# Patient Record
Sex: MALE | Race: BLACK OR AFRICAN AMERICAN | Employment: UNEMPLOYED | URBAN - METROPOLITAN AREA
[De-identification: names, ages, dates, MRNs, and addresses within clinical notes are randomized per-mention and may not be internally consistent; named-entity substitution may affect disease eponyms.]

---

## 2024-08-06 ENCOUNTER — HOSPITAL ENCOUNTER (EMERGENCY)
Facility: HOSPITAL | Age: 34
Discharge: HOME | End: 2024-08-06
Attending: EMERGENCY MEDICINE

## 2024-08-06 VITALS
OXYGEN SATURATION: 99 % | BODY MASS INDEX: 20.4 KG/M2 | HEIGHT: 67 IN | HEART RATE: 72 BPM | RESPIRATION RATE: 16 BRPM | DIASTOLIC BLOOD PRESSURE: 76 MMHG | WEIGHT: 130 LBS | SYSTOLIC BLOOD PRESSURE: 119 MMHG | TEMPERATURE: 99.1 F

## 2024-08-06 DIAGNOSIS — E87.6 HYPOKALEMIA: Primary | ICD-10-CM

## 2024-08-06 LAB
ANION GAP SERPL CALC-SCNC: 5 MMOL/L (ref 5–15)
BASOPHILS # BLD: 0 K/UL (ref 0–0.1)
BASOPHILS NFR BLD: 1 % (ref 0–1)
BUN SERPL-MCNC: 16 MG/DL (ref 6–20)
BUN/CREAT SERPL: 13 (ref 12–20)
CA-I BLD-MCNC: 8.9 MG/DL (ref 8.5–10.1)
CHLORIDE SERPL-SCNC: 109 MMOL/L (ref 97–108)
CO2 SERPL-SCNC: 26 MMOL/L (ref 21–32)
CREAT SERPL-MCNC: 1.23 MG/DL (ref 0.7–1.3)
DIFFERENTIAL METHOD BLD: ABNORMAL
EOSINOPHIL # BLD: 0 K/UL (ref 0–0.4)
EOSINOPHIL NFR BLD: 1 % (ref 0–7)
ERYTHROCYTE [DISTWIDTH] IN BLOOD BY AUTOMATED COUNT: 13.1 % (ref 11.5–14.5)
ETHANOL SERPL-MCNC: <10 MG/DL (ref 0–0.08)
GLUCOSE SERPL-MCNC: 109 MG/DL (ref 65–100)
HCT VFR BLD AUTO: 33.2 % (ref 36.6–50.3)
HGB BLD-MCNC: 11.4 G/DL (ref 12.1–17)
IMM GRANULOCYTES # BLD AUTO: 0 K/UL (ref 0–0.04)
IMM GRANULOCYTES NFR BLD AUTO: 1 % (ref 0–0.5)
LYMPHOCYTES # BLD: 1.4 K/UL (ref 0.8–3.5)
LYMPHOCYTES NFR BLD: 32 % (ref 12–49)
MCH RBC QN AUTO: 31.3 PG (ref 26–34)
MCHC RBC AUTO-ENTMCNC: 34.3 G/DL (ref 30–36.5)
MCV RBC AUTO: 91.2 FL (ref 80–99)
MONOCYTES # BLD: 0.4 K/UL (ref 0–1)
MONOCYTES NFR BLD: 9 % (ref 5–13)
NEUTS SEG # BLD: 2.5 K/UL (ref 1.8–8)
NEUTS SEG NFR BLD: 56 % (ref 32–75)
NRBC # BLD: 0 K/UL (ref 0–0.01)
NRBC BLD-RTO: 0 PER 100 WBC
PLATELET # BLD AUTO: 303 K/UL (ref 150–400)
PMV BLD AUTO: 9.3 FL (ref 8.9–12.9)
POTASSIUM SERPL-SCNC: 3.1 MMOL/L (ref 3.5–5.1)
RBC # BLD AUTO: 3.64 M/UL (ref 4.1–5.7)
SODIUM SERPL-SCNC: 140 MMOL/L (ref 136–145)
WBC # BLD AUTO: 4.4 K/UL (ref 4.1–11.1)

## 2024-08-06 PROCEDURE — 85025 COMPLETE CBC W/AUTO DIFF WBC: CPT

## 2024-08-06 PROCEDURE — 36415 COLL VENOUS BLD VENIPUNCTURE: CPT

## 2024-08-06 PROCEDURE — 80048 BASIC METABOLIC PNL TOTAL CA: CPT

## 2024-08-06 PROCEDURE — 99283 EMERGENCY DEPT VISIT LOW MDM: CPT

## 2024-08-06 PROCEDURE — 82077 ASSAY SPEC XCP UR&BREATH IA: CPT

## 2024-08-06 RX ORDER — POTASSIUM CHLORIDE 20 MEQ/1
20 TABLET, EXTENDED RELEASE ORAL DAILY
Qty: 20 TABLET | Refills: 0 | Status: SHIPPED | OUTPATIENT
Start: 2024-08-06

## 2024-08-06 ASSESSMENT — PAIN - FUNCTIONAL ASSESSMENT: PAIN_FUNCTIONAL_ASSESSMENT: NONE - DENIES PAIN

## 2024-08-06 NOTE — ED PROVIDER NOTES
affecting Diagnosis/Treatment: Patient is homeless/has housing insecurity.  Given Housing Resources.    Smoking Cessation: Not Applicable    PROCEDURES   Unless otherwise noted above, none  Procedures      CRITICAL CARE TIME   Patient does not meet Critical Care Time, 0 minutes    ED IMPRESSION     1. Hypokalemia          DISPOSITION/PLAN   DISPOSITION Decision To Discharge 08/06/2024 05:20:58 PM    Discharge Note: The patient is stable for discharge home. The signs, symptoms, diagnosis, and discharge instructions have been discussed, understanding conveyed, and agreed upon. The patient is to follow up as recommended or return to ER should their symptoms worsen.      PATIENT REFERRED TO:  Elida Valenzuela MD  [address] [place], [place] [id number] [phone]    Schedule an appointment as soon as possible for a visit           DISCHARGE MEDICATIONS:     Medication List        START taking these medications      potassium chloride 20 MEQ extended release tablet  Commonly known as: KLOR-CON M  Take 1 tablet by mouth daily               Where to Get Your Medications        Information about where to get these medications is not yet available    Ask your nurse or doctor about these medications  potassium chloride 20 MEQ extended release tablet           DISCONTINUED MEDICATIONS:  Current Discharge Medication List          I am the Primary Clinician of Record. Amari Cook MD (electronically signed)    (Please note that parts of this dictation were completed with voice recognition software. Quite often unanticipated grammatical, syntax, homophones, and other interpretive errors are inadvertently transcribed by the computer software. Please disregards these errors. Please excuse any errors that have escaped final proofreading.)     Amari Cook MD  08/06/24 9542

## 2024-08-06 NOTE — ED TRIAGE NOTES
Pt was dropped off by PD after walking around at [employer] looking for euros, A&Ox4, pt hard to understand due to accent but he may have flight of ideas, pt reports just wanting to get check as the lady told him

## 2024-08-06 NOTE — DISCHARGE INSTRUCTIONS
Thank you for choosing our Emergency Department for your care.  It is our privilege to care for you in your time of need.  In the next several days, you may receive a survey via email or mailed to your home about your experience with our team.  We would greatly appreciate you taking a few minutes to complete the survey, as we use this information to learn what we have done well and what we could be doing better. Thank you for trusting us with your care!    Below you will find a list of your tests from today's visit.   Labs  Recent Results (from the past 12 hour(s))   BMP    Collection Time: 08/06/24  4:08 PM   Result Value Ref Range    Sodium 140 136 - 145 mmol/L    Potassium 3.1 (L) 3.5 - 5.1 mmol/L    Chloride 109 (H) 97 - 108 mmol/L    CO2 26 21 - 32 mmol/L    Anion Gap 5 5 - 15 mmol/L    Glucose 109 (H) 65 - 100 mg/dL    BUN 16 6 - 20 mg/dL    Creatinine 1.23 0.70 - 1.30 mg/dL    BUN/Creatinine Ratio 13 12 - 20      Est, Glom Filt Rate 78 >60 ml/min/1.73m2    Calcium 8.9 8.5 - 10.1 mg/dL   CBC with Auto Differential    Collection Time: 08/06/24  4:08 PM   Result Value Ref Range    WBC 4.4 4.1 - 11.1 K/uL    RBC 3.64 (L) 4.10 - 5.70 M/uL    Hemoglobin 11.4 (L) 12.1 - 17.0 g/dL    Hematocrit 33.2 (L) 36.6 - 50.3 %    MCV 91.2 80.0 - 99.0 FL    MCH 31.3 26.0 - 34.0 PG    MCHC 34.3 30.0 - 36.5 g/dL    RDW 13.1 11.5 - 14.5 %    Platelets 303 150 - 400 K/uL    MPV 9.3 8.9 - 12.9 FL    Nucleated RBCs 0.0 0.0  WBC    nRBC 0.00 0.00 - 0.01 K/uL    Neutrophils % 56 32 - 75 %    Lymphocytes % 32 12 - 49 %    Monocytes % 9 5 - 13 %    Eosinophils % 1 0 - 7 %    Basophils % 1 0 - 1 %    Immature Granulocytes % 1 (H) 0 - 0.5 %    Neutrophils Absolute 2.5 1.8 - 8.0 K/UL    Lymphocytes Absolute 1.4 0.8 - 3.5 K/UL    Monocytes Absolute 0.4 0.0 - 1.0 K/UL    Eosinophils Absolute 0.0 0.0 - 0.4 K/UL    Basophils Absolute 0.0 0.0 - 0.1 K/UL    Immature Granulocytes Absolute 0.0 0.00 - 0.04 K/UL    Differential Type

## 2024-08-06 NOTE — BSMART NOTE
Comprehensive Assessment Form Part 1      Section I - Disposition      No past medical history on file.       The Medical Doctor to Psychiatrist conference was not completed.  The Medical Doctor is in agreement with disposition of discharge with resources pending medical clearance.    The plan is discharge with resources.  The on-call Psychiatrist consulted was Dr. Cancino who agreed with resources with community support.  The admitting Psychiatrist will be Dr. MENDEZ/LALA.  The admitting Diagnosis is N/A.  The Payor source is None.      This writer reviewed the [employer] Suicide Severity Rating Scale in nursing flowsheet and the risk level assigned is NO risk.  Based on this assessment, the risk of suicide is NO risk and the plan is discharge with resources as patient does not meet criteria and does not want to be admitted at this time.    Section II - Integrated Summary  Summary:      Per Triage: \"Pt was dropped off by PD after walking around at [facility] looking for euros, A&Ox4, pt hard to understand due to accent but he may have flight of ideas, pt reports just wanting to get check as the lady told him\".    [employer] met with patient at bedside in ED room #24. Per triage report patient was brought in by police department after walking around at [facility] looking for euros and was brought in to get checked out after observing flight of ideas.  [employer] introduced self/role and received consent for assessment.      Patient is Doe Thomas, 34 year old male that was brought in by PD for evaluation as a result of being found looking for euros and observed to have a flight of ideas.  Writer observed patient to be calm and cooperative throughout assessment as evidenced by answering questions from writer.  Patient was observed to have a flight of ideas and stated \"I'm the One[place] of [place]\" \"I'm the [place] Oneill\" and expressed concerned for family in [place].  Patient was observed to be emotional when talking about politics and the things he has

## 2024-08-09 ENCOUNTER — HOSPITAL ENCOUNTER (EMERGENCY)
Facility: HOSPITAL | Age: 34
Discharge: HOME | End: 2024-08-09
Attending: STUDENT IN AN ORGANIZED HEALTH CARE EDUCATION/TRAINING PROGRAM

## 2024-08-09 VITALS
SYSTOLIC BLOOD PRESSURE: 127 MMHG | RESPIRATION RATE: 19 BRPM | HEART RATE: 80 BPM | BODY MASS INDEX: 20.4 KG/M2 | WEIGHT: 130 LBS | DIASTOLIC BLOOD PRESSURE: 64 MMHG | OXYGEN SATURATION: 99 % | TEMPERATURE: 98.6 F | HEIGHT: 67 IN

## 2024-08-09 DIAGNOSIS — S39.012A STRAIN OF LUMBAR REGION, INITIAL ENCOUNTER: ICD-10-CM

## 2024-08-09 DIAGNOSIS — Z59.00 HOMELESSNESS: Primary | ICD-10-CM

## 2024-08-09 PROCEDURE — 6370000000 HC RX 637 (ALT 250 FOR IP): Performed by: STUDENT IN AN ORGANIZED HEALTH CARE EDUCATION/TRAINING PROGRAM

## 2024-08-09 PROCEDURE — 99283 EMERGENCY DEPT VISIT LOW MDM: CPT

## 2024-08-09 RX ORDER — IBUPROFEN 200 MG
400 TABLET ORAL
Status: COMPLETED | OUTPATIENT
Start: 2024-08-09 | End: 2024-08-09

## 2024-08-09 RX ADMIN — IBUPROFEN 400 MG: 200 TABLET, FILM COATED ORAL at 17:36

## 2024-08-09 SDOH — ECONOMIC STABILITY - HOUSING INSECURITY: HOMELESSNESS UNSPECIFIED: Z59.00

## 2024-08-09 ASSESSMENT — PAIN - FUNCTIONAL ASSESSMENT: PAIN_FUNCTIONAL_ASSESSMENT: 0-10

## 2024-08-09 ASSESSMENT — LIFESTYLE VARIABLES
HOW OFTEN DO YOU HAVE A DRINK CONTAINING ALCOHOL: NEVER
HOW MANY STANDARD DRINKS CONTAINING ALCOHOL DO YOU HAVE ON A TYPICAL DAY: PATIENT DOES NOT DRINK

## 2024-08-09 ASSESSMENT — PAIN DESCRIPTION - LOCATION: LOCATION: BACK

## 2024-08-09 ASSESSMENT — PAIN SCALES - GENERAL: PAINLEVEL_OUTOF10: 3

## 2024-08-09 NOTE — ED TRIAGE NOTES
Pt endorses homelessness after \"his nicholas told him he had to go\". Pt denies SI/HI. Pt does endorse back pain from walking.

## 2024-08-09 NOTE — ED NOTES
Patient discharged by Yaya Castillo pt sent to the front lobby, with strong and steady gait, no acute distress noted at time of discharge - Discharge information / home RX / and reasons to return to the ED were reviewed by the ED provider.

## 2024-08-09 NOTE — ED PROVIDER NOTES
[facility] EMERGENCY DEPT  EMERGENCY DEPARTMENT HISTORY AND PHYSICAL EXAM      Date: 8/9/2024  Patient Name: Doe Thomas  MRN: [id number]  Birthdate 1990  Date of evaluation: 8/9/2024  Provider: Daryl Javier MD   Note Started: 4:49 PM EDT 8/9/24    HISTORY OF PRESENT ILLNESS     Chief Complaint   Patient presents with    Back Pain    Homeless       History Provided By: Patient    HPI: Doe Thomas is a 34 y.o. male with no significant past medical history presents for evaluation of homelessness and back pain.  Patient states he has been homeless and thus walking around a lot and sitting on hard surfaces.  This is led to generalized back pain, worse with certain positions.  No motor weakness or loss of sensation.  No recent trauma.  No urinary incontinence or retention    PAST MEDICAL HISTORY   Past Medical History:  No past medical history on file.    Past Surgical History:  No past surgical history on file.    Family History:  No family history on file.    Social History:       Allergies:  No Known Allergies    PCP: No primary care provider on file.    Current Meds:   Current Facility-Administered Medications   Medication Dose Route Frequency Provider Last Rate Last Admin    ibuprofen (ADVIL;MOTRIN) tablet 400 mg  400 mg Oral NOW Daryl Javier MD         Current Outpatient Medications   Medication Sig Dispense Refill    potassium chloride (KLOR-CON M) 20 MEQ extended release tablet Take 1 tablet by mouth daily 20 tablet 0       Social Determinants of Health:   Social Determinants of Health     Tobacco Use: Not on file   Alcohol Use: Not At Risk (8/9/2024)    AUDIT-C     Frequency of Alcohol Consumption: Never     Average Number of Drinks: Patient does not drink     Frequency of Binge Drinking: Never   Financial Resource Strain: Not on file   Food Insecurity: Not on file   Transportation Needs: Not on file   Physical Activity: Not on file   Stress: Not on file   Social Connections: Not on file

## 2024-08-09 NOTE — DISCHARGE INSTRUCTIONS
______________________________________________________________________  [facility] ([employer]    For women and children  [address] [place], [place]  [phone]  Opens at 10am    The continuum of supportive services in housing plans may include, but are not limited to:  Referrals to job finding assistance agencies  Budgeting and money management workshops  Mental health and substance abuse screening and counseling  Domestic violence and sexual abuse counseling  Parenting and nutrition instruction  ______________________________________________________________________  [facility]    Now open to residents 18 years and older.  Location: [address] [place] [address] in [place]  Will accept guests seeking cold refuge starting at 8pm throughout the winter months.   Guests must be onsite and signed in by 10pm.   Shelter closes every morning at 8am.    For more information contact Jailene Gonzalez at:  [email or web address] or call [phone]  ______________________________________________________________________   [facility]    Open nightly 7pm to 7am from November to April  Located in the Ballroom at [facility]: [address] [place], [place]  Entrance is on the side of the building to the left of the main entrance.   You can arrive as early as 6pm and  line. Check-in is 7pm to 9pm. They offer warm dinner and bagged breakfast.  Contact: [phone]  ______________________________________________________________________            ADDITIONAL RESOURCES:      ______________________________________________________________________  [facility]    Call for assistance with social work  [phone]  ______________________________________________________________________      _______________________________________________________________________  [facility] ([facility]) -

## 2024-08-10 ENCOUNTER — HOSPITAL ENCOUNTER (EMERGENCY)
Facility: HOSPITAL | Age: 34
Discharge: HOME | End: 2024-08-10
Attending: STUDENT IN AN ORGANIZED HEALTH CARE EDUCATION/TRAINING PROGRAM
Payer: MEDICAID

## 2024-08-10 VITALS
HEIGHT: 67 IN | HEART RATE: 63 BPM | DIASTOLIC BLOOD PRESSURE: 61 MMHG | SYSTOLIC BLOOD PRESSURE: 106 MMHG | BODY MASS INDEX: 20.4 KG/M2 | TEMPERATURE: 98.6 F | OXYGEN SATURATION: 100 % | WEIGHT: 130 LBS | RESPIRATION RATE: 18 BRPM

## 2024-08-10 DIAGNOSIS — M79.18 MUSCULOSKELETAL PAIN: Primary | ICD-10-CM

## 2024-08-10 PROCEDURE — 6370000000 HC RX 637 (ALT 250 FOR IP): Performed by: STUDENT IN AN ORGANIZED HEALTH CARE EDUCATION/TRAINING PROGRAM

## 2024-08-10 PROCEDURE — 99283 EMERGENCY DEPT VISIT LOW MDM: CPT

## 2024-08-10 PROCEDURE — 36415 COLL VENOUS BLD VENIPUNCTURE: CPT

## 2024-08-10 PROCEDURE — 93005 ELECTROCARDIOGRAM TRACING: CPT | Performed by: STUDENT IN AN ORGANIZED HEALTH CARE EDUCATION/TRAINING PROGRAM

## 2024-08-10 RX ORDER — ACETAMINOPHEN 500 MG
1000 TABLET ORAL EVERY 6 HOURS PRN
Qty: 40 TABLET | Refills: 0 | Status: ON HOLD | OUTPATIENT
Start: 2024-08-10

## 2024-08-10 RX ORDER — METHOCARBAMOL 1000 MG/1
1000 TABLET, COATED ORAL 4 TIMES DAILY PRN
Qty: 20 TABLET | Refills: 0 | Status: ON HOLD | OUTPATIENT
Start: 2024-08-10 | End: 2024-08-15

## 2024-08-10 RX ORDER — ACETAMINOPHEN 500 MG
1000 TABLET ORAL
Status: COMPLETED | OUTPATIENT
Start: 2024-08-10 | End: 2024-08-10

## 2024-08-10 RX ORDER — METHOCARBAMOL 500 MG/1
750 TABLET, FILM COATED ORAL ONCE
Status: COMPLETED | OUTPATIENT
Start: 2024-08-10 | End: 2024-08-10

## 2024-08-10 RX ADMIN — METHOCARBAMOL 750 MG: 500 TABLET ORAL at 22:14

## 2024-08-10 RX ADMIN — ACETAMINOPHEN 1000 MG: 500 TABLET ORAL at 22:15

## 2024-08-10 ASSESSMENT — PAIN SCALES - GENERAL: PAINLEVEL_OUTOF10: 5

## 2024-08-10 ASSESSMENT — PAIN - FUNCTIONAL ASSESSMENT: PAIN_FUNCTIONAL_ASSESSMENT: 0-10

## 2024-08-11 LAB
EKG ATRIAL RATE: 66 BPM
EKG DIAGNOSIS: NORMAL
EKG P AXIS: 62 DEGREES
EKG P-R INTERVAL: 148 MS
EKG Q-T INTERVAL: 422 MS
EKG QRS DURATION: 94 MS
EKG QTC CALCULATION (BAZETT): 442 MS
EKG R AXIS: 47 DEGREES
EKG T AXIS: 40 DEGREES
EKG VENTRICULAR RATE: 66 BPM

## 2024-08-11 NOTE — ED PROVIDER NOTES
Binge Drinking: Never   Financial Resource Strain: Not on file   Food Insecurity: Not on file   Transportation Needs: Not on file   Physical Activity: Not on file   Stress: Not on file   Social Connections: Not on file   Intimate Partner Violence: Not on file   Depression: Not on file   Housing Stability: Not on file   Interpersonal Safety: Not At Risk (8/10/2024)    Interpersonal Safety Domain Source: IP Abuse Screening     Physical abuse: Denies     Verbal abuse: Denies     Emotional abuse: Denies     Financial abuse: Denies     Sexual abuse: Denies   Utilities: Not on file       PHYSICAL EXAM   Physical Exam  Vitals and nursing note reviewed.   Constitutional:       General: He is not in acute distress.     Appearance: He is not ill-appearing, toxic-appearing or diaphoretic.   HENT:      Head: Normocephalic and atraumatic.   Eyes:      Extraocular Movements: Extraocular movements intact.      Conjunctiva/sclera: Conjunctivae normal.   Cardiovascular:      Rate and Rhythm: Normal rate and regular rhythm.   Pulmonary:      Effort: Pulmonary effort is normal.      Breath sounds: Normal breath sounds.   Abdominal:      Palpations: Abdomen is soft.      Tenderness: There is no abdominal tenderness.   Musculoskeletal:         General: No swelling, tenderness, deformity or signs of injury.   Neurological:      Mental Status: He is alert and oriented to person, place, and time.           SCREENINGS                No data recorded    LAB, EKG AND DIAGNOSTIC RESULTS   Labs:  No results found for this or any previous visit (from the past 12 hour(s)).    EKG:.See ED Course Below    Radiologic Studies:  Non-plain film images such as CT, Ultrasound and MRI are read by the radiologist. Plain radiographic images are visualized and preliminarily interpreted by the ED Provider with the following findings: Not Applicable.    Interpretation per the Radiologist below, if available at the time of this note:  No orders to display

## 2024-08-12 ENCOUNTER — HOSPITAL ENCOUNTER (EMERGENCY)
Facility: HOSPITAL | Age: 34
Discharge: LEFT BEFORE DISCHARGE | DRG: 751 | End: 2024-08-12
Attending: EMERGENCY MEDICINE
Payer: MEDICAID

## 2024-08-12 ENCOUNTER — HOSPITAL ENCOUNTER (EMERGENCY)
Facility: HOSPITAL | Age: 34
Discharge: HOME | End: 2024-08-12

## 2024-08-12 VITALS
WEIGHT: 130 LBS | DIASTOLIC BLOOD PRESSURE: 97 MMHG | HEIGHT: 67 IN | BODY MASS INDEX: 20.4 KG/M2 | TEMPERATURE: 98.6 F | OXYGEN SATURATION: 98 % | HEART RATE: 73 BPM | RESPIRATION RATE: 20 BRPM | SYSTOLIC BLOOD PRESSURE: 128 MMHG

## 2024-08-12 DIAGNOSIS — Z53.21 ELOPED FROM EMERGENCY DEPARTMENT: Primary | ICD-10-CM

## 2024-08-12 DIAGNOSIS — R46.89 DISORGANIZED BEHAVIOR: ICD-10-CM

## 2024-08-12 DIAGNOSIS — Z59.00 HOMELESSNESS: ICD-10-CM

## 2024-08-12 DIAGNOSIS — R21 RASH AND OTHER NONSPECIFIC SKIN ERUPTION: ICD-10-CM

## 2024-08-12 PROCEDURE — 99281 EMR DPT VST MAYX REQ PHY/QHP: CPT

## 2024-08-12 SDOH — ECONOMIC STABILITY - HOUSING INSECURITY: HOMELESSNESS UNSPECIFIED: Z59.00

## 2024-08-12 ASSESSMENT — LIFESTYLE VARIABLES
HOW MANY STANDARD DRINKS CONTAINING ALCOHOL DO YOU HAVE ON A TYPICAL DAY: PATIENT DOES NOT DRINK
HOW OFTEN DO YOU HAVE A DRINK CONTAINING ALCOHOL: NEVER

## 2024-08-12 ASSESSMENT — PAIN SCALES - GENERAL: PAINLEVEL_OUTOF10: 5

## 2024-08-12 ASSESSMENT — PAIN - FUNCTIONAL ASSESSMENT: PAIN_FUNCTIONAL_ASSESSMENT: 0-10

## 2024-08-12 NOTE — ED TRIAGE NOTES
Pt states having rash/break out on skin.     Noted to be on left wrist in triage, pt states also having the rash on his back and buttocks.     Pt is visually hallucinating in triage, seeing things flying around him.

## 2024-08-12 NOTE — ED PROVIDER NOTES
[facility] EMERGENCY DEPT  EMERGENCY DEPARTMENT HISTORY AND PHYSICAL EXAM      Date: 8/12/2024  Patient Name: Doe Thomas      History of Presenting Illness       Chief Complaint   Patient presents with    Rash       History was provided by: Patient    Location/Duration/Severity/Modifying factors     Doe Thomas is a 34 y.o. male who arrived to the emergency department by Ambulatory with complaints of Rash        34-year-old male with unknown medical history presenting to the ED with complaint of a rash, located on the left wrist.  While interviewing the patient about the rash she had frequent pauses where he ignored my questions and at times said inappropriate things such as \"Pee Pee Poo Poo Joe\".  He does respond when I ask about his mental health about hallucinations to which she denies and denies any SI or HI.  He denies any mental health concerns in general, he is currently homeless.  Rash started over the last couple of days although he denies any exposure to any plants, he says he thinks it may be related to \"food poisoning\".  He states he \"wants some liquid\" clarified he want something to drink          There are no other complaints, changes, or physical findings at this time.    PCP: No primary care provider on file.    No current facility-administered medications for this encounter.     Current Outpatient Medications   Medication Sig Dispense Refill    acetaminophen (TYLENOL) 500 MG tablet Take 2 tablets by mouth every 6 hours as needed for Pain 40 tablet 0    Methocarbamol 1000 MG TABS Take 1,000 mg by mouth 4 times daily as needed (Muscle spasms) 20 tablet 0    potassium chloride (KLOR-CON M) 20 MEQ extended release tablet Take 1 tablet by mouth daily 20 tablet 0       Past History     Past Medical History:  History reviewed. No pertinent past medical history.    Past Surgical History:  History reviewed. No pertinent surgical history.    Family History:  History reviewed. No pertinent family history.    Social  nonspecific skin eruption        PATIENT REFERRED TO:  No follow-up provider specified.    DISCHARGE MEDICATIONS:  New Prescriptions    No medications on file       Attestations:    Maximo Polo DO    Please note that this dictation was completed with [employer], the computer voice recognition software.  Quite often unanticipated grammatical, syntax, homophones, and other interpretive errors are inadvertently transcribed by the computer software.  Please disregard these errors.  Please excuse any errors that have escaped final proofreading.  Thank you.         Maximo Polo DO  08/12/24 0401

## 2024-08-12 NOTE — BSMART NOTE
This writer aware of pt, however pt eloped.  Dr Polo states that there is no need to petition for ECO at this time

## 2024-08-13 ENCOUNTER — HOSPITAL ENCOUNTER (INPATIENT)
Facility: HOSPITAL | Age: 34
LOS: 8 days | Discharge: HOME | DRG: 751 | End: 2024-08-21
Attending: PSYCHIATRY & NEUROLOGY | Admitting: PSYCHIATRY & NEUROLOGY
Payer: MEDICAID

## 2024-08-13 DIAGNOSIS — F99 PSYCHIATRIC PROBLEM: Primary | ICD-10-CM

## 2024-08-13 PROBLEM — F29 PSYCHOSIS, UNSPECIFIED PSYCHOSIS TYPE (HCC): Status: ACTIVE | Noted: 2024-08-13

## 2024-08-13 LAB
ALBUMIN SERPL-MCNC: 4 G/DL (ref 3.5–5)
ALBUMIN/GLOB SERPL: 1.1 (ref 1.1–2.2)
ALP SERPL-CCNC: 56 U/L (ref 45–117)
ALT SERPL-CCNC: 34 U/L (ref 12–78)
AMPHET UR QL SCN: NEGATIVE
ANION GAP SERPL CALC-SCNC: 7 MMOL/L (ref 5–15)
APAP SERPL-MCNC: <2 UG/ML (ref 10–30)
AST SERPL W P-5'-P-CCNC: 17 U/L (ref 15–37)
BARBITURATES UR QL SCN: NEGATIVE
BASOPHILS # BLD: 0 K/UL (ref 0–0.1)
BASOPHILS NFR BLD: 0 % (ref 0–1)
BENZODIAZ UR QL: NEGATIVE
BILIRUB SERPL-MCNC: 1 MG/DL (ref 0.2–1)
BUN SERPL-MCNC: 15 MG/DL (ref 6–20)
BUN/CREAT SERPL: 12 (ref 12–20)
CA-I BLD-MCNC: 9.6 MG/DL (ref 8.5–10.1)
CANNABINOIDS UR QL SCN: NEGATIVE
CHLORIDE SERPL-SCNC: 109 MMOL/L (ref 97–108)
CO2 SERPL-SCNC: 23 MMOL/L (ref 21–32)
COCAINE UR QL SCN: NEGATIVE
CREAT SERPL-MCNC: 1.21 MG/DL (ref 0.7–1.3)
DATE LAST DOSE: ABNORMAL
DATE LAST DOSE: ABNORMAL
DIFFERENTIAL METHOD BLD: ABNORMAL
DOSE AMOUNT: ABNORMAL UNITS
DOSE AMOUNT: ABNORMAL UNITS
EOSINOPHIL # BLD: 0 K/UL (ref 0–0.4)
EOSINOPHIL NFR BLD: 1 % (ref 0–7)
ERYTHROCYTE [DISTWIDTH] IN BLOOD BY AUTOMATED COUNT: 12.6 % (ref 11.5–14.5)
ETHANOL SERPL-MCNC: <10 MG/DL (ref 0–0.08)
GLOBULIN SER CALC-MCNC: 3.6 G/DL (ref 2–4)
GLUCOSE SERPL-MCNC: 83 MG/DL (ref 65–100)
HCT VFR BLD AUTO: 34.5 % (ref 36.6–50.3)
HGB BLD-MCNC: 11.8 G/DL (ref 12.1–17)
IMM GRANULOCYTES # BLD AUTO: 0 K/UL (ref 0–0.04)
IMM GRANULOCYTES NFR BLD AUTO: 0 % (ref 0–0.5)
LYMPHOCYTES # BLD: 1.3 K/UL (ref 0.8–3.5)
LYMPHOCYTES NFR BLD: 37 % (ref 12–49)
Lab: NORMAL
MCH RBC QN AUTO: 30.5 PG (ref 26–34)
MCHC RBC AUTO-ENTMCNC: 34.2 G/DL (ref 30–36.5)
MCV RBC AUTO: 89.1 FL (ref 80–99)
METHADONE UR QL: NEGATIVE
MONOCYTES # BLD: 0.5 K/UL (ref 0–1)
MONOCYTES NFR BLD: 14 % (ref 5–13)
NEUTS SEG # BLD: 1.7 K/UL (ref 1.8–8)
NEUTS SEG NFR BLD: 48 % (ref 32–75)
NRBC # BLD: 0 K/UL (ref 0–0.01)
NRBC BLD-RTO: 0 PER 100 WBC
OPIATES UR QL: NEGATIVE
PCP UR QL: NEGATIVE
PLATELET # BLD AUTO: 288 K/UL (ref 150–400)
PMV BLD AUTO: 9.7 FL (ref 8.9–12.9)
POTASSIUM SERPL-SCNC: 3.3 MMOL/L (ref 3.5–5.1)
PROT SERPL-MCNC: 7.6 G/DL (ref 6.4–8.2)
RBC # BLD AUTO: 3.87 M/UL (ref 4.1–5.7)
SALICYLATES SERPL-MCNC: <1.7 MG/DL (ref 2.8–20)
SODIUM SERPL-SCNC: 139 MMOL/L (ref 136–145)
WBC # BLD AUTO: 3.5 K/UL (ref 4.1–11.1)

## 2024-08-13 PROCEDURE — 80143 DRUG ASSAY ACETAMINOPHEN: CPT

## 2024-08-13 PROCEDURE — 80179 DRUG ASSAY SALICYLATE: CPT

## 2024-08-13 PROCEDURE — 80053 COMPREHEN METABOLIC PANEL: CPT

## 2024-08-13 PROCEDURE — 80307 DRUG TEST PRSMV CHEM ANLYZR: CPT

## 2024-08-13 PROCEDURE — 36415 COLL VENOUS BLD VENIPUNCTURE: CPT

## 2024-08-13 PROCEDURE — 85025 COMPLETE CBC W/AUTO DIFF WBC: CPT

## 2024-08-13 PROCEDURE — 6370000000 HC RX 637 (ALT 250 FOR IP): Performed by: PSYCHIATRY & NEUROLOGY

## 2024-08-13 PROCEDURE — 1240000000 HC EMOTIONAL WELLNESS R&B

## 2024-08-13 PROCEDURE — 90791 PSYCH DIAGNOSTIC EVALUATION: CPT

## 2024-08-13 PROCEDURE — 6370000000 HC RX 637 (ALT 250 FOR IP)

## 2024-08-13 PROCEDURE — 82077 ASSAY SPEC XCP UR&BREATH IA: CPT

## 2024-08-13 PROCEDURE — 99285 EMERGENCY DEPT VISIT HI MDM: CPT

## 2024-08-13 RX ORDER — HALOPERIDOL 5 MG/1
5 TABLET ORAL ONCE
Status: COMPLETED | OUTPATIENT
Start: 2024-08-13 | End: 2024-08-13

## 2024-08-13 RX ORDER — MAGNESIUM HYDROXIDE/ALUMINUM HYDROXICE/SIMETHICONE 120; 1200; 1200 MG/30ML; MG/30ML; MG/30ML
30 SUSPENSION ORAL EVERY 6 HOURS PRN
Status: DISCONTINUED | OUTPATIENT
Start: 2024-08-13 | End: 2024-08-21 | Stop reason: HOSPADM

## 2024-08-13 RX ORDER — RISPERIDONE 1 MG/1
1 TABLET ORAL 2 TIMES DAILY
Status: DISCONTINUED | OUTPATIENT
Start: 2024-08-13 | End: 2024-08-21 | Stop reason: HOSPADM

## 2024-08-13 RX ORDER — TRAZODONE HYDROCHLORIDE 50 MG/1
50 TABLET ORAL NIGHTLY
Status: DISCONTINUED | OUTPATIENT
Start: 2024-08-13 | End: 2024-08-21 | Stop reason: HOSPADM

## 2024-08-13 RX ORDER — HALOPERIDOL 5 MG/1
TABLET ORAL
Status: DISPENSED
Start: 2024-08-13 | End: 2024-08-14

## 2024-08-13 RX ORDER — HYDROXYZINE PAMOATE 50 MG/1
50 CAPSULE ORAL 3 TIMES DAILY PRN
Status: DISCONTINUED | OUTPATIENT
Start: 2024-08-13 | End: 2024-08-21 | Stop reason: HOSPADM

## 2024-08-13 RX ORDER — ACETAMINOPHEN 325 MG/1
650 TABLET ORAL EVERY 4 HOURS PRN
Status: DISCONTINUED | OUTPATIENT
Start: 2024-08-13 | End: 2024-08-21 | Stop reason: HOSPADM

## 2024-08-13 RX ADMIN — TRAZODONE HYDROCHLORIDE 50 MG: 50 TABLET ORAL at 20:43

## 2024-08-13 RX ADMIN — HALOPERIDOL 5 MG: 5 TABLET ORAL at 15:10

## 2024-08-13 RX ADMIN — POTASSIUM BICARBONATE 40 MEQ: 782 TABLET, EFFERVESCENT ORAL at 15:10

## 2024-08-13 RX ADMIN — RISPERIDONE 1 MG: 1 TABLET, FILM COATED ORAL at 20:43

## 2024-08-13 ASSESSMENT — PAIN - FUNCTIONAL ASSESSMENT: PAIN_FUNCTIONAL_ASSESSMENT: NONE - DENIES PAIN

## 2024-08-13 ASSESSMENT — SLEEP AND FATIGUE QUESTIONNAIRES
AVERAGE NUMBER OF SLEEP HOURS: 8
DO YOU HAVE DIFFICULTY SLEEPING: NO
DO YOU USE A SLEEP AID: NO

## 2024-08-13 ASSESSMENT — LIFESTYLE VARIABLES
HOW MANY STANDARD DRINKS CONTAINING ALCOHOL DO YOU HAVE ON A TYPICAL DAY: PATIENT UNABLE TO ANSWER
HOW OFTEN DO YOU HAVE A DRINK CONTAINING ALCOHOL: PATIENT UNABLE TO ANSWER

## 2024-08-13 NOTE — BSMART NOTE
Paperless ECO executed at 0950AM by SAAD Escobar.  Donna with [employer] currently assessing pt via iPad/Zoom

## 2024-08-13 NOTE — ED NOTES
TRANSFER - OUT REPORT:    Verbal report given to ANNA MARIE Hdez on Doe Thomas  being transferred to 240/1  for routine progression of patient care       Report consisted of patient's Situation, Background, Assessment and   Recommendations(SBAR).     Information from the following report(s) Nurse Handoff Report, ED Encounter Summary, Adult Overview, Intake/Output, MAR, Recent Results, and Neuro Assessment was reviewed with the receiving nurse.    [place] Fall Assessment:    Presents to emergency department  because of falls (Syncope, seizure, or loss of consciousness): No  Age > 70: No  Altered Mental Status, Intoxication with alcohol or substance confusion (Disorientation, impaired judgment, poor safety awaremess, or inability to follow instructions): No  Impaired Mobility: Ambulates or transfers with assistive devices or assistance; Unable to ambulate or transer.: No  Nursing Judgement: No          Lines:       Opportunity for questions and clarification was provided.      Patient transported with:  Security, [employer], Tech

## 2024-08-13 NOTE — ED TRIAGE NOTES
Pt BIB [employer] under paperless [employer]CO, for looking into vehicles, possibly delusional, hallucinations, yelling, walking into traffic. Pt stating, \"I make money. Look for my passport and visa, you stole them from me over the years,\" on arrival, pt not making complete sentences. Pt manic on arrival

## 2024-08-13 NOTE — ED NOTES
Pt placed in green gown, belongings secured and checked by security at this time. Pt wanded. Pt cuffed to bed rail on right wrist.

## 2024-08-13 NOTE — BSMART NOTE
Per Darrel with [facility], pt accepted by Dr Cancino to 240/1 pending TDO arrival.  PPO Esdras aware

## 2024-08-13 NOTE — ED PROVIDER NOTES
and summary of external notes): Prior medical records and Nursing notes    Vitals:    Vitals:    08/13/24 1000   BP: 133/81   Pulse: 91   Resp: 18   Temp: 98.4 °F (36.9 °C)   TempSrc: Oral   SpO2: 100%        ED COURSE  ED Course as of 08/13/24 1441   Tue Aug 13, 2024   1336 To evaluate for possible organic cause and medically cleared the patient for psychiatric admission, CBC, CMP, UDS, salicylate level, acetaminophen level, and EtOH were obtained.  CBC showed leukopenia and anemia.  WBCs was 3.5.  This is decreased from results on 8/6/2024 when WBCs was 4.4 hemoglobin was found to be 11.8.  Hematocrit 34.5.  This is improved from results on 8/6/2024.  CMP revealed hypokalemia with potassium of 3.3.  This is improved from potassium of 3.1 on 8/6/20/2024.  Potassium will be orally repleted.  ethanol level <10. acetaminophen level <2.  Salicylic acid level <1.7.  UDS was negative.  Patient medically cleared time.  [HW]      ED Course User Index  [HW] Amanda Joshi PA-C       SEPSIS Reassessment: Sepsis reassessment not applicable    Clinical Management Tools:      Patient was given the following medications:  Medications   potassium bicarb-citric acid (EFFER-K) effervescent tablet 40 mEq (has no administration in time range)   haloperidol (HALDOL) tablet 5 mg (has no administration in time range)       CONSULTS: See ED Course/MDM for further details.  IP CONSULT TO BSMART     Social Determinants affecting Diagnosis/Treatment: Patient lacks a PCP. Given PCP/Free clinic resources. and Patient is homeless/has housing insecurity.  Given Housing Resources.    Smoking Cessation: Not Applicable    PROCEDURES   Unless otherwise noted above, none.  Procedures      CRITICAL CARE TIME   Patient does not meet Critical Care Time, 0 minutes    ED IMPRESSION     1. Psychiatric problem          DISPOSITION/PLAN   DISPOSITION Admitted 08/13/2024 02:36:43 PM    Admit Note: Pt is being admitted by Dr. Cancino. The results of their

## 2024-08-14 LAB
DEPRECATED S PYO AG THROAT QL EIA: NEGATIVE
SARS-COV-2 RNA RESP QL NAA+PROBE: NOT DETECTED
SPECIMEN SOURCE: NORMAL

## 2024-08-14 PROCEDURE — 87635 SARS-COV-2 COVID-19 AMP PRB: CPT

## 2024-08-14 PROCEDURE — 87880 STREP A ASSAY W/OPTIC: CPT

## 2024-08-14 PROCEDURE — 6370000000 HC RX 637 (ALT 250 FOR IP): Performed by: PSYCHIATRY & NEUROLOGY

## 2024-08-14 PROCEDURE — 6370000000 HC RX 637 (ALT 250 FOR IP): Performed by: INTERNAL MEDICINE

## 2024-08-14 PROCEDURE — 1240000000 HC EMOTIONAL WELLNESS R&B

## 2024-08-14 PROCEDURE — 87070 CULTURE OTHR SPECIMN AEROBIC: CPT

## 2024-08-14 RX ORDER — POTASSIUM CHLORIDE 20 MEQ/1
40 TABLET, EXTENDED RELEASE ORAL ONCE
Status: COMPLETED | OUTPATIENT
Start: 2024-08-14 | End: 2024-08-14

## 2024-08-14 RX ADMIN — POTASSIUM CHLORIDE 40 MEQ: 1500 TABLET, EXTENDED RELEASE ORAL at 17:44

## 2024-08-14 RX ADMIN — HYDROXYZINE PAMOATE 50 MG: 50 CAPSULE ORAL at 06:00

## 2024-08-14 RX ADMIN — RISPERIDONE 1 MG: 1 TABLET, FILM COATED ORAL at 21:18

## 2024-08-14 RX ADMIN — ACETAMINOPHEN 650 MG: 325 TABLET ORAL at 06:00

## 2024-08-14 RX ADMIN — TRAZODONE HYDROCHLORIDE 50 MG: 50 TABLET ORAL at 21:18

## 2024-08-14 RX ADMIN — RISPERIDONE 1 MG: 1 TABLET, FILM COATED ORAL at 09:59

## 2024-08-14 ASSESSMENT — PAIN DESCRIPTION - ORIENTATION: ORIENTATION: MID

## 2024-08-14 ASSESSMENT — PAIN DESCRIPTION - LOCATION: LOCATION: NECK;THROAT

## 2024-08-14 ASSESSMENT — PAIN DESCRIPTION - DESCRIPTORS: DESCRIPTORS: DISCOMFORT

## 2024-08-14 ASSESSMENT — PAIN SCALES - GENERAL
PAINLEVEL_OUTOF10: 9
PAINLEVEL_OUTOF10: 4

## 2024-08-14 NOTE — PLAN OF CARE
Problem: Risk for Elopement  Goal: Patient will not exit the unit/facility without proper excort  8/14/2024 0651 by Rivka Donald, RN  Outcome: Progressing  8/13/2024 1738 by Miley Santillan, RN  Outcome: Progressing  Flowsheets (Taken 8/13/2024 1001 by Amrita Cast, RN)  Nursing Interventions for Elopement Risk: Assist with personal care needs such as toileting, eating, dressing, as needed to reduce the risk of wandering     -no attempt to exit from the door to the unit; pt is redirectable and cooperative

## 2024-08-14 NOTE — CARE COORDINATION
08/14/24 1612   ITP   Date of Plan 08/14/24   Date of Next Review 08/21/24   Primary Diagnosis Code Psychosis, Unspecified Psychosis Type (Hcc)   Barriers to Treatment Need for psychoeducation;Client resistance   Strengths Incorporated in Plan Community supports   Plan of Care   Long Term Goal (LTG) Stated in patient/guardian terms Pt not able to articulate a goal at this time   Short Term Goal 1   Short Term Goal 1 Client will be oriented to program and staff, and participate in assessment process   Baseline Functioning unknown   Target Pt will be less guarded and willing to discuss situation and accept treatment   Objectives Client will participate in group therapy   Intervention 1 Group therapy   Frequency Daily   Measured by Behavioral data;Self report;Staff observation   Staff Responsible [facility] staff;Clinical staff   Intervention 2 Milieu therapy and support   Frequency Daily   Measured by Behavioral data;Self report;Staff observation   Staff Responsible [facility] staff;Clinical staff   Intervention 3 Acknowledge client strengths   Frequency Daily   Measured by Behavioral data;Self report;Staff observation   Staff Responsible [facility] staff;Clinical staff   STG Goal 1 Status: Patient Appears to be  Treatment plan goal is unmet at this time   Short Term Goal 2   Short Term Goal 2 Client will learn and demonstrate improved self management skills   Baseline Functioning Pt is homeless. Connections unknown at this time.   Target Pt will be referred to community stabilization   Objectives Client will participate in group therapy   Intervention 1 Monitor medications   Frequency Daily   Measured by Behavioral data;Self report;Staff observation   Staff Responsible [facility] staff;Clinical staff   Intervention 2 Assess safety   Frequency Daily   Measured by Behavioral data;Self report;Staff observation   Staff Responsible [facility] staff;Clinical staff   Intervention 3 Acknowledge client strengths   Frequency Daily   Measured by Behavioral

## 2024-08-14 NOTE — GROUP NOTE
Group Therapy Note    Date: 8/13/2024    Group Start Time: 1945  Group End Time: 2030  Group Topic: Recreational    SSR 2 BH NON ACUTE    Wendy Koch        Group Therapy Note    Attendees: 8/9  Recreational Therapist facilitated structured leisure skills group to introduce healthy leisure skills as positive way to cope and manage mood.         Patient's Goal:  Attend groups dailiy    Notes:  Attended group. Response was limited. Pt made some exaggerated movements in group with ritualistic movement. Required prompts from staff. Was preoccupied with a peer.     Status After Intervention:  Unchanged    Participation Level: Minimal    Participation Quality: Attentive with cues      Speech:  difficult to understand      Thought Process/Content: Logical      Affective Functioning: Incongruent      Mood: anxious      Level of consciousness:  Preoccupied      Response to Learning: Progressing to goal      Endings: None Reported    Modes of Intervention: Activity      Discipline Responsible: Recreational Therapist      Signature:  WADE See

## 2024-08-14 NOTE — CONSULTS
Consult Date: 8/14/2024    Chief Complaint: None  Chief Complaint   Patient presents with    Mental Health Problem      HPI: HPI patient is a 34 years old -American man who has been admitted here for psychiatry valuation treatment he denies any history of cough fever chills nausea chest pain shortness of breath nausea vomiting diarrhea abdominal pain or black stool no history of generalized body ache fatigue.  No history of increased frequency of micturition or painful micturition no history of any joint pain or joint swelling no history of ear or nasal discharge no history of throat pain or earache no history of loss of taste or smell.  ROS:Review of Systems     Constitutional: Negative  HEENT: Negative  CVS: Negative  RS: Negative  GI: Negative  : Negative  Musculoskeletal: Negative  Immunology: Records are not available  Neurology: Negative  Endocrine: Negative  Haem-Onc: Negative  Skin: Negative  Psychiatry: Depression  Allergies  No Known Allergies  FAMILY HISTORY:  History reviewed. No pertinent family history.  SOCIAL HISTORY:  Social History     Socioeconomic History    Marital status: Single     Spouse name: Not on file    Number of children: Not on file    Years of education: Not on file    Highest education level: Not on file   Occupational History    Not on file   Tobacco Use    Smoking status: Never    Smokeless tobacco: Never   Substance and Sexual Activity    Alcohol use: Never    Drug use: Never    Sexual activity: Not on file   Other Topics Concern    Not on file   Social History Narrative    Not on file     Social Determinants of Health     Financial Resource Strain: Not on file   Food Insecurity: Patient Declined (8/13/2024)    Hunger Vital Sign     Worried About Running Out of Food in the Last Year: Patient declined     Ran Out of Food in the Last Year: Patient declined   Transportation Needs: Patient Declined (8/13/2024)    PRAPARE - Transportation     Lack of Transportation

## 2024-08-14 NOTE — GROUP NOTE
Group Therapy Note    Date: 8/14/2024    Group Start Time: 0930  Group End Time: 1000  Group Topic: Community Meeting    [facility]    Stephania Kirkpatrick        Group Therapy Note    Attendees:4/9     Notes:  patient invited to group but did not attend     Discipline Responsible: Behavorial Health Tech      Signature:  Stephania Kirkpatrick

## 2024-08-14 NOTE — H&P
[place] [id number]                                PSYCH H&P      PATIENT NAME: MARIAN PERAZA                  : 1990  MED REC NO: [id number]                       ROOM: 239  ACCOUNT NO: [id number]                       ADMIT DATE: 2024  PROVIDER: Santos Bar MD    DATE:  2024    HISTORY OF PRESENT ILLNESS:  This is a 34-year-old  male patient reportedly of [facility] background, admitted to Behavioral Health Unit under TDO at the request of local [facility].  The patient is a very perplexed poor historian, confused, delusional.  He presented to the ED originally stating rash breakout on skin noted to be on the left wrist in triage.  The patient stated also having the rash on his back and buttocks.  The patient noted to be in the ED visually hallucinating in triage, seeing things flying around him.  Apparently, the patient was brought on a paperless ECO and then D19 was seen for prescreening and TDO'ed him.  The patient is confused, perplexed, difficulty expressing himself, guarded, paranoid, rambled about he needs to go home.  He could not tell me why he is here.  He was exhibiting bizarre behavior, delusional thinking, flight of ideas, poor insight and judgment.  Apparently, he told the nursing staff he is the son of juares of [place].  He denied any thoughts to harm himself or others.  No agitation.  No aggression.  Apparently, he was in the community, was walking in and out of the traffic, yelling at the cruz expressing that he was waiting for the money to give Queen Lara's daughter, talking about the [place] beast in real and is on the way, verbalized that he needs a ticket to get back to [place] to rule the world.  The patient preoccupied with inner thoughts.  He refused to sign any paperwork.  Poor self-care, smelling bad body odor.  He thought coming here was incarceration.  He wanted to

## 2024-08-14 NOTE — PLAN OF CARE
Problem: Behavior  Goal: Pt/Family maintain appropriate behavior and adhere to behavioral management agreement, if implemented  Description: INTERVENTIONS:  1. Assess patient/family's coping skills and  non-compliant behavior (including use of illegal substances)  2. Notify security of behavior or suspected illegal substances which indicate the need for search of the family and/or belongings  3. Encourage verbalization of thoughts and concerns in a socially appropriate manner  4. Utilize positive, consistent limit setting strategies supporting safety of patient, staff and others  5. Encourage participation in the decision making process about the behavioral management agreement  6. If a visitor's behavior poses a threat to safety call refer to organization policy.  7. Initiate consult with , Psychosocial CNS, Spiritual Care as appropriate  Outcome: Progressing     Problem: Anxiety  Goal: Will report anxiety at manageable levels  Description: INTERVENTIONS:  1. Administer medication as ordered  2. Teach and rehearse alternative coping skills  3. Provide emotional support with 1:1 interaction with staff  Outcome: Progressing     Problem: Depression  Goal: Will be euthymic at discharge  Description: INTERVENTIONS:  1. Administer medication as ordered  2. Provide emotional support via 1:1 interaction with staff  3. Encourage involvement in milieu/groups/activities  4. Monitor for social isolation  Outcome: Progressing     Problem: Psychosis  Goal: Will report no hallucinations or delusions  Description: INTERVENTIONS:  1. Administer medication as  ordered  2. Assist with reality testing to support increasing orientation  3. Assess if patient's hallucinations or delusions are encouraging self harm or harm to others and intervene as appropriate  Outcome: Progressing

## 2024-08-14 NOTE — GROUP NOTE
Group Therapy Note    Date: 8/14/2024    Group Start Time: 1600  Group End Time: 1630  Group Topic: Nursing    [facility]R 2 [facility] ACUTE    Erin Calero RN        Group Therapy Note    Attendees: 2/10       Patient's Goal: Pt encouraged but did not attend       Status After Intervention:  Unchanged      Discipline Responsible: Registered Nurse      Signature:  Erin Calero RN

## 2024-08-14 NOTE — GROUP NOTE
Group Therapy Note    Date: 8/14/2024    Group Start Time: 1300  Group End Time: 1330  Group Topic: Process Group - Inpatient    [facility]    Homar Solis        Group Therapy Note: Each individual was provided with a handout on \"introduction to anxiety\" to work on and reach out to any of the  if further assistance was needed in processing the responses.     Attendees: 7       Patient's Goal:  to attend group.    Notes:  Pt was provided with a handout.       Signature:  Homar Solis

## 2024-08-15 LAB
ANION GAP SERPL CALC-SCNC: 4 MMOL/L (ref 5–15)
BUN SERPL-MCNC: 10 MG/DL (ref 6–20)
BUN/CREAT SERPL: 8 (ref 12–20)
CA-I BLD-MCNC: 9.4 MG/DL (ref 8.5–10.1)
CHLORIDE SERPL-SCNC: 107 MMOL/L (ref 97–108)
CO2 SERPL-SCNC: 27 MMOL/L (ref 21–32)
CREAT SERPL-MCNC: 1.27 MG/DL (ref 0.7–1.3)
GLUCOSE SERPL-MCNC: 72 MG/DL (ref 65–100)
POTASSIUM SERPL-SCNC: 3.6 MMOL/L (ref 3.5–5.1)
SODIUM SERPL-SCNC: 138 MMOL/L (ref 136–145)

## 2024-08-15 PROCEDURE — 6370000000 HC RX 637 (ALT 250 FOR IP): Performed by: PSYCHIATRY & NEUROLOGY

## 2024-08-15 PROCEDURE — 36415 COLL VENOUS BLD VENIPUNCTURE: CPT

## 2024-08-15 PROCEDURE — 1240000000 HC EMOTIONAL WELLNESS R&B

## 2024-08-15 PROCEDURE — 80048 BASIC METABOLIC PNL TOTAL CA: CPT

## 2024-08-15 RX ADMIN — RISPERIDONE 1 MG: 1 TABLET, FILM COATED ORAL at 20:56

## 2024-08-15 RX ADMIN — TRAZODONE HYDROCHLORIDE 50 MG: 50 TABLET ORAL at 20:56

## 2024-08-15 RX ADMIN — RISPERIDONE 1 MG: 1 TABLET, FILM COATED ORAL at 09:05

## 2024-08-15 NOTE — GROUP NOTE
Group Therapy Note    Date: 8/15/2024    Group Start Time: 1120  Group End Time: 1155  Group Topic: Education Group - Inpatient    SSR 2  NON ACUTE    Paty Hoffman        Group Therapy Note    Facilitated group to focus on defining different types of defense mechanisms and being able to recognize examples of some defenses that was used to cope with stress and anxiety       Attendees: 5/8      Notes:  Encouraged but did not attend    Discipline Responsible: Recreational Therapist      Signature:  WADE Bradford

## 2024-08-15 NOTE — GROUP NOTE
Group Therapy Note    Date: 8/15/2024    Group Start Time: 0948  Group End Time: 1014  Group Topic: Activity    [facility]R 2 [facility] ACUTE    Harini Francis        Group Therapy Note    Attendees: 3 of 10       Patient's Goal:      Notes:  PATIENT ENCOURAGED TO ATTEND BUT DECLINED.    Status After Intervention:      Participation Level:     Participation Quality:       Speech:        Thought Process/Content:           Mood:       Level of consciousness:      Response to Learning:       Endings:     Modes of Intervention:       Discipline Responsible: Behavorial Health Tech      Signature:  Harini Francis

## 2024-08-15 NOTE — GROUP NOTE
Group Therapy Note    Date: 8/15/2024    Group Start Time: 1300  Group End Time: 1340  Group Topic: Process Group - Inpatient    SSR 2 [facility] ACUTE    Temi Pendleton MSW        Group Therapy Note: facilitator engaged the group in therapeutic discussion about emotions and the importance of managing/regulating emotions.     Attendees: 4       Patient's Goal:  to attend groups    Notes:  Pt presented malodorous and responding to internal stimuli, groaning at times. He did answer questions appropriately in regards to the importance of coping skills to manage stress.    Status After Intervention:  Improved    Participation Level: Active Listener    Participation Quality: Appropriate and Attentive      Speech:  hesitant      Thought Process/Content: Delusional  Hallucinating      Affective Functioning: Flat      Mood: dysphoric      Level of consciousness:  Alert and Preoccupied      Response to Learning: Able to verbalize current knowledge/experience, Able to verbalize/acknowledge new learning, Able to retain information, Capable of insight, Able to change behavior, and Progressing to goal      Endings: None Reported    Modes of Intervention: Education, Support, and Socialization      Discipline Responsible: /Counselor      Signature:  ASHLEE MCCARTNEY

## 2024-08-15 NOTE — GROUP NOTE
Group Therapy Note    Date: 8/15/2024    Group Start Time: 1550  Group End Time: 1635  Group Topic: Recreational    SSR 2  NON ACUTE    Paty Hoffman        Group Therapy Note    Facilitated leisure skills group to reinforce positive coping and to manage mood through music, social interaction, group activities and art task       Attendees: 5/8       Patient's Goal:  Client will learn and demonstrate improved self management skills    Notes:  Pt was receptive to listening to music and a song he selected while writing in journal. Interacted with peer and staff    Status After Intervention:  Improved    Participation Level: Active Listener and Interactive    Participation Quality: Appropriate      Speech:  normal      Thought Process/Content: Logical      Affective Functioning: Congruent      Mood:  calm      Level of consciousness:  Attentive      Response to Learning: Progressing to goal      Endings: None Reported    Modes of Intervention: Socialization and Activity      Discipline Responsible: Recreational Therapist      Signature:  WADE Bradford

## 2024-08-15 NOTE — GROUP NOTE
Group Therapy Note    Date: 8/14/2024    Group Start Time: 1945  Group End Time: 2030  Group Topic: Recreational    SSR 2 [facility] NON ACUTE    Wendy Koch        Group Therapy Note    Attendees: 5/10     Recreational Therapist facilitated structured leisure skills group to introduce healthy leisure skills as positive way to cope and manage mood.        Patient's Goal:  Client will learn and demonstrate improved self management skills     Notes: Attended group and listened to songs with peers.  Pt was receptive to intervention and responded to prompts from staff. Attended entire session and was attentive during group.       Status After Intervention:  Improved    Participation Level: Active Listener    Participation Quality: Appropriate      Speech:  normal      Thought Process/Content: Logical      Affective Functioning: Congruent      Mood:  calm       Level of consciousness:  Alert      Response to Learning: Progressing to goal      Endings: None Reported    Modes of Intervention: Activity      Discipline Responsible: Recreational Therapist      Signature:  WADE See

## 2024-08-15 NOTE — GROUP NOTE
Group Therapy Note    Date: 8/15/2024    Group Start Time: 1745  Group End Time: 1815  Group Topic: Nursing    [facility]    Evonne Kendall RN    [facility]    Group Therapy Note    Attendees: 4/8       Patient's Goal:      Notes:  Patient encouraged to attend group but declined and remained in his room.     Status After Intervention:      Participation Level:     Participation Quality:       Speech:        Thought Process/Content:       Affective Functioning:       Mood:       Level of consciousness:        Response to Learning:       Endings:     Modes of Intervention:       Discipline Responsible: Registered Nurse      Signature:  Evonne Kendall RN

## 2024-08-15 NOTE — PLAN OF CARE
Problem: Risk for Elopement  Goal: Patient will not exit the unit/facility without proper excort  Outcome: Progressing     Problem: Behavior  Goal: Pt/Family maintain appropriate behavior and adhere to behavioral management agreement, if implemented  Description: INTERVENTIONS:  1. Assess patient/family's coping skills and  non-compliant behavior (including use of illegal substances)  2. Notify security of behavior or suspected illegal substances which indicate the need for search of the family and/or belongings  3. Encourage verbalization of thoughts and concerns in a socially appropriate manner  4. Utilize positive, consistent limit setting strategies supporting safety of patient, staff and others  5. Encourage participation in the decision making process about the behavioral management agreement  6. If a visitor's behavior poses a threat to safety call refer to organization policy.  7. Initiate consult with , Psychosocial CNS, Spiritual Care as appropriate  8/14/2024 7964 by Sanjana Bowen, RN  Outcome: Progressing  8/14/2024 1443 by Deepti Badillo, RN  Outcome: Progressing

## 2024-08-16 LAB
BACTERIA SPEC CULT: NORMAL
Lab: NORMAL

## 2024-08-16 PROCEDURE — 6370000000 HC RX 637 (ALT 250 FOR IP): Performed by: PSYCHIATRY & NEUROLOGY

## 2024-08-16 PROCEDURE — 1240000000 HC EMOTIONAL WELLNESS R&B

## 2024-08-16 RX ADMIN — TRAZODONE HYDROCHLORIDE 50 MG: 50 TABLET ORAL at 20:19

## 2024-08-16 RX ADMIN — RISPERIDONE 1 MG: 1 TABLET, FILM COATED ORAL at 08:44

## 2024-08-16 RX ADMIN — RISPERIDONE 1 MG: 1 TABLET, FILM COATED ORAL at 20:19

## 2024-08-16 NOTE — GROUP NOTE
Group Therapy Note    Date: 8/16/2024    Group Start Time: 1050  Group End Time: 1110  Group Topic: Nursing    [facility]    Evonne Kendall RN    MEDICATION COMPLIANCE:     Group Therapy Note    Attendees: 3/8       Patient's Goal:      Notes:  Patient encouraged to attend the group but declined and remained in his room.     Status After Intervention:      Participation Level:     Participation Quality:       Speech:      Thought Process/Content:       Affective Functioning:       Mood:       Level of consciousness:        Response to Learning:       Endings:     Modes of Intervention:       Discipline Responsible: Registered Nurse      Signature:  Evonne Kendall, RN

## 2024-08-16 NOTE — PLAN OF CARE
Problem: Risk for Elopement  Goal: Patient will not exit the unit/facility without proper excort  8/15/2024 2021 by Francine Mcclelland LPN  Outcome: Progressing  8/15/2024 1101 by Erin Calero RN  Outcome: Progressing     Problem: Discharge Planning  Goal: Discharge to home or other facility with appropriate resources  8/15/2024 2021 by Francine Mcclelland LPN  Outcome: Progressing  8/15/2024 1101 by Erin Calero RN  Outcome: Progressing     Problem: Depression  Goal: Will be euthymic at discharge  Description: INTERVENTIONS:  1. Administer medication as ordered  2. Provide emotional support via 1:1 interaction with staff  3. Encourage involvement in milieu/groups/activities  4. Monitor for social isolation  8/15/2024 2021 by Francine Mcclelland LPN  Outcome: Progressing  8/15/2024 1101 by Erin Calero RN  Outcome: Progressing     Problem: Evy  Goal: Will exhibit normal sleep and speech and no impulsivity  Description: INTERVENTIONS:  1. Administer medication as ordered  2. Set limits on impulsive behavior  3. Make attempts to decrease external stimuli as possible  Outcome: Progressing     Problem: Psychosis  Goal: Will report no hallucinations or delusions  Description: INTERVENTIONS:  1. Administer medication as  ordered  2. Assist with reality testing to support increasing orientation  3. Assess if patient's hallucinations or delusions are encouraging self harm or harm to others and intervene as appropriate  Outcome: Progressing     Problem: Behavior  Goal: Pt/Family maintain appropriate behavior and adhere to behavioral management agreement, if implemented  Description: INTERVENTIONS:  1. Assess patient/family's coping skills and  non-compliant behavior (including use of illegal substances)  2. Notify security of behavior or suspected illegal substances which indicate the need for search of the family and/or belongings  3. Encourage verbalization of thoughts and concerns in a socially appropriate manner  4.

## 2024-08-16 NOTE — GROUP NOTE
Group Therapy Note    Date: 8/16/2024    Group Start Time: 0920  Group End Time: 0950  Group Topic: Community Meeting    [facility]    Harini Francis        Group Therapy Note    Attendees: 3 of 8       Patient's Goal:      Notes:  Patient encouraged to attend but declined.    Status After Intervention:      Participation Level:     Participation Quality:       Speech:        Thought Process/Content:       Affective Functioning:       Mood:       Level of consciousness:        Response to Learning:       Endings:     Modes of Intervention:       Discipline Responsible: Behavorial Health Tech      Signature:  Harini Francis

## 2024-08-16 NOTE — GROUP NOTE
Group Therapy Note    Date: 8/16/2024    Group Start Time: 1315  Group End Time: 1405  Group Topic: Process Group - Inpatient    [facility]R 2 [facility]    Elizabeth Agustin        Group Therapy Note  Process group was focused on Self-care. Writer provided the pts with a self-care wheel and ideas for a bad day. Writer asked \"on a scale 1-10 how would you rate your current self-care\" as an icebreaker. Writer went over physical, psychological, emotional, spiritual, personal and professional categories. Pts interacted well with one another but had to be redirected.   Attendees: 6-7       Patient's Goal:  none stated    Notes:  Pt was observed actively hallucinating during group. Pt was talking to himself and would get up and look on the ground like an object was there. Pt did not speak with the others but did share he likes to \"read\" and \"puzzles\". Pts speech was garbled and low.     Status After Intervention:  Unchanged    Participation Level: Minimal    Participation Quality: Attentive      Speech:  slurred      Thought Process/Content: Hallucinating      Affective Functioning: Flat      Mood: euphoric      Level of consciousness:  Preoccupied      Response to Learning: Able to change behavior      Endings: None Reported    Modes of Intervention: Support and Exploration      Discipline Responsible: /Counselor      Signature:  Elizabeth Agustin

## 2024-08-16 NOTE — PLAN OF CARE
Problem: Behavior  Goal: Pt/Family maintain appropriate behavior and adhere to behavioral management agreement, if implemented  Description: INTERVENTIONS:  1. Assess patient/family's coping skills and  non-compliant behavior (including use of illegal substances)  2. Notify security of behavior or suspected illegal substances which indicate the need for search of the family and/or belongings  3. Encourage verbalization of thoughts and concerns in a socially appropriate manner  4. Utilize positive, consistent limit setting strategies supporting safety of patient, staff and others  5. Encourage participation in the decision making process about the behavioral management agreement  6. If a visitor's behavior poses a threat to safety call refer to organization policy.  7. Initiate consult with , Psychosocial CNS, Spiritual Care as appropriate  Outcome: Progressing

## 2024-08-16 NOTE — GROUP NOTE
Group Therapy Note    Date: 8/16/2024    Group Start Time: 1515  Group End Time: 1545  Group Topic: Education Group - Inpatient    SSR 2  NON ACUTE    Paty Hoffman        Group Therapy Note    Setting Goals/Facilitated discussion focused on “stepping up to a better you” by taking steps to improve in their well-being and identifying goals that would help to ensure follow-up       Attendees: 5/8      Notes:  Encouraged but did not attend    Discipline Responsible: Recreational Therapist      Signature:  WADE Bradford

## 2024-08-16 NOTE — GROUP NOTE
Group Therapy Note    Date: 8/16/2024    Group Start Time: 1545  Group End Time: 1630  Group Topic: Recreational    SSR 2  NON ACUTE    Paty Hoffman        Group Therapy Note    Facilitated leisure skills group to reinforce positive coping and to manage mood through music, social interaction, group activities and art task       Attendees: 5/8       Patient's Goal:  Client will learn and demonstrate improved self management skills    Notes:  Pt was receptive to listening to music and songs he selected. Interacted with peer and staff when prompted. Declined to work on leisure task. Pt was noted to be responding to internal stimuli. Pt was talking to self and making facial expressions     Status After Intervention:  Unchanged    Participation Level: Active Listener and Interactive when prompted    Participation Quality: Appropriate      Speech:  normal      Thought Process/Content: Hallucinating      Affective Functioning: Flat      Mood:  calm      Level of consciousness:  Alert and Preoccupied      Response to Learning: Progressing to goal      Endings: None Reported    Modes of Intervention: Socialization and Activity      Discipline Responsible: Recreational Therapist      Signature:  WADE Bradford

## 2024-08-17 PROCEDURE — 1240000000 HC EMOTIONAL WELLNESS R&B

## 2024-08-17 PROCEDURE — 6370000000 HC RX 637 (ALT 250 FOR IP): Performed by: PSYCHIATRY & NEUROLOGY

## 2024-08-17 RX ADMIN — RISPERIDONE 1 MG: 1 TABLET, FILM COATED ORAL at 08:35

## 2024-08-17 RX ADMIN — TRAZODONE HYDROCHLORIDE 50 MG: 50 TABLET ORAL at 20:50

## 2024-08-17 RX ADMIN — RISPERIDONE 1 MG: 1 TABLET, FILM COATED ORAL at 20:50

## 2024-08-17 NOTE — GROUP NOTE
Group Therapy Note    Date: 8/17/2024    Group Start Time: 1515  Group End Time: 1600  Group Topic: Recreational    SSR 2 BH NON ACUTE    Wendy Koch        Group Therapy Note    Attendees: 6/10     Recreational Therapist facilitated structured leisure skills group to introduce healthy leisure skills as positive way to cope and manage mood.             Patient's Goal:  Client will learn and demonstrate improved self management skills        Notes:  Attended group and listened to songs with peers.  Pt was receptive to intervention and responded to prompts from staff. Pt in and out of session.      Status After Intervention:  Improved    Participation Level: Active Listener    Participation Quality: Appropriate      Speech:  normal      Thought Process/Content: Logical      Affective Functioning: Congruent      Mood:  calm      Level of consciousness:  Alert      Response to Learning: Progressing to goal      Endings: None Reported    Modes of Intervention: Activity      Discipline Responsible: Recreational Therapist      Signature:  WADE See

## 2024-08-17 NOTE — PLAN OF CARE
Problem: Risk for Elopement  Goal: Patient will not exit the unit/facility without proper excort  Outcome: Progressing     Problem: Behavior  Goal: Pt/Family maintain appropriate behavior and adhere to behavioral management agreement, if implemented  Description: INTERVENTIONS:  1. Assess patient/family's coping skills and  non-compliant behavior (including use of illegal substances)  2. Notify security of behavior or suspected illegal substances which indicate the need for search of the family and/or belongings  3. Encourage verbalization of thoughts and concerns in a socially appropriate manner  4. Utilize positive, consistent limit setting strategies supporting safety of patient, staff and others  5. Encourage participation in the decision making process about the behavioral management agreement  6. If a visitor's behavior poses a threat to safety call refer to organization policy.  7. Initiate consult with , Psychosocial CNS, Spiritual Care as appropriate  8/16/2024 2226 by Rivka Donald, RN  Outcome: Progressing  8/16/2024 1241 by Danielle Coffey, RN  Outcome: Progressing     Problem: Anxiety  Goal: Will report anxiety at manageable levels  Description: INTERVENTIONS:  1. Administer medication as ordered  2. Teach and rehearse alternative coping skills  3. Provide emotional support with 1:1 interaction with staff  Outcome: Progressing    -no attempt to exit the unit.  -no behavioral issues noted or reported.  -pt has been calm and cooperative.

## 2024-08-17 NOTE — PLAN OF CARE
Problem: Depression  Goal: Will be euthymic at discharge  Description: INTERVENTIONS:  1. Administer medication as ordered  2. Provide emotional support via 1:1 interaction with staff  3. Encourage involvement in milieu/groups/activities  4. Monitor for social isolation  Outcome: Progressing     Problem: Behavior  Goal: Pt/Family maintain appropriate behavior and adhere to behavioral management agreement, if implemented  Description: INTERVENTIONS:  1. Assess patient/family's coping skills and  non-compliant behavior (including use of illegal substances)  2. Notify security of behavior or suspected illegal substances which indicate the need for search of the family and/or belongings  3. Encourage verbalization of thoughts and concerns in a socially appropriate manner  4. Utilize positive, consistent limit setting strategies supporting safety of patient, staff and others  5. Encourage participation in the decision making process about the behavioral management agreement  6. If a visitor's behavior poses a threat to safety call refer to organization policy.  7. Initiate consult with , Psychosocial CNS, Spiritual Care as appropriate  Outcome: Progressing     Problem: Sleep Disturbance  Goal: Will exhibit normal sleeping pattern  Description: INTERVENTIONS:  1. Administer medication as ordered  2. Decrease environmental stimuli, including noise, as appropriate  3. Discourage social isolation and naps during the day  Outcome: Progressing

## 2024-08-17 NOTE — GROUP NOTE
Group Therapy Note    Date: 8/17/2024    Group Start Time: 1045  Group End Time: 1130  Group Topic: Education Group - Inpatient    [facility] ACUTE    Wendy Koch        Group Therapy Note    Attendees: 4/10    Facilitated structured group to increase awareness of triggers, encourage pt. to explore places, people and situations that cause strong emotional responses and encourage pt to identify positive ways to manage triggers.         Patient's Goal:    Client will learn and demonstrate improved self management skills     Notes:  Attended group discussion and received materials from group. Verbalized he was feeling \"good so far...feeling sensitive\". PT able to identify triggers and related positive ways of coping. Related accessing support from friends. Was receptive to intervention.     Status After Intervention:  Improved    Participation Level: Active Listener    Participation Quality: Appropriate      Speech:  normal      Thought Process/Content: Logical      Affective Functioning: Congruent      Mood:  calm       Level of consciousness:  Alert      Response to Learning: Progressing to goal      Endings: None Reported    Modes of Intervention: Education      Discipline Responsible: Recreational Therapist      Signature:  WADE See

## 2024-08-18 PROCEDURE — 6370000000 HC RX 637 (ALT 250 FOR IP): Performed by: PSYCHIATRY & NEUROLOGY

## 2024-08-18 PROCEDURE — 1240000000 HC EMOTIONAL WELLNESS R&B

## 2024-08-18 RX ADMIN — RISPERIDONE 1 MG: 1 TABLET, FILM COATED ORAL at 08:13

## 2024-08-18 RX ADMIN — RISPERIDONE 1 MG: 1 TABLET, FILM COATED ORAL at 20:58

## 2024-08-18 RX ADMIN — TRAZODONE HYDROCHLORIDE 50 MG: 50 TABLET ORAL at 20:58

## 2024-08-18 NOTE — PLAN OF CARE
Problem: Depression  Goal: Will be euthymic at discharge  Description: INTERVENTIONS:  1. Administer medication as ordered  2. Provide emotional support via 1:1 interaction with staff  3. Encourage involvement in milieu/groups/activities  4. Monitor for social isolation  8/17/2024 2129 by Matias Irby RN  Outcome: Progressing     Problem: Psychosis  Goal: Will report no hallucinations or delusions  Description: INTERVENTIONS:  1. Administer medication as  ordered  2. Assist with reality testing to support increasing orientation  3. Assess if patient's hallucinations or delusions are encouraging self harm or harm to others and intervene as appropriate  Outcome: Progressing

## 2024-08-18 NOTE — GROUP NOTE
Group Therapy Note    Date: 8/18/2024    Group Start Time: 1500  Group End Time: 1600  Group Topic: Recreational    [facility] 2 [facility] NON ACUTE    Wendy Koch        Group Therapy Note    Attendees: 7/12     Recreational Therapist facilitated structured leisure skills group to introduce healthy leisure skills as positive way to cope and manage mood.        Notes:  Did not attend group despite encouragement      Discipline Responsible: Recreational Therapist      Signature:  WADE See

## 2024-08-18 NOTE — GROUP NOTE
Group Therapy Note    Date: 8/18/2024    Group Start Time: 1045  Group End Time: 1130  Group Topic: Education Group - Inpatient    [facility] NON ACUTE    Wendy Koch        Group Therapy Note    Attendees: 5/11    Facilitated structured group discussion to identify protective factors that have been valuable and protective factors that could be improved in managing stressors.             Notes:  Did not attend group despite encouragement    Discipline Responsible: Recreational Therapist      Signature:  WADE See

## 2024-08-18 NOTE — PLAN OF CARE
Problem: Depression  Goal: Will be euthymic at discharge  Description: INTERVENTIONS:  1. Administer medication as ordered  2. Provide emotional support via 1:1 interaction with staff  3. Encourage involvement in milieu/groups/activities  4. Monitor for social isolation  8/18/2024 1018 by Chetna Anne RN  Outcome: Progressing  8/17/2024 2129 by Matias Irby RN  Outcome: Progressing     Problem: Psychosis  Goal: Will report no hallucinations or delusions  Description: INTERVENTIONS:  1. Administer medication as  ordered  2. Assist with reality testing to support increasing orientation  3. Assess if patient's hallucinations or delusions are encouraging self harm or harm to others and intervene as appropriate  8/18/2024 1018 by Chetna Anne RN  Outcome: Progressing  8/17/2024 2129 by Matias Irby RN  Outcome: Progressing

## 2024-08-19 PROCEDURE — 1240000000 HC EMOTIONAL WELLNESS R&B

## 2024-08-19 PROCEDURE — 6370000000 HC RX 637 (ALT 250 FOR IP): Performed by: PSYCHIATRY & NEUROLOGY

## 2024-08-19 RX ORDER — ZIPRASIDONE HYDROCHLORIDE 20 MG/1
20 CAPSULE ORAL EVERY 12 HOURS PRN
Status: DISCONTINUED | OUTPATIENT
Start: 2024-08-19 | End: 2024-08-21 | Stop reason: HOSPADM

## 2024-08-19 RX ADMIN — ZIPRASIDONE HYDROCHLORIDE 20 MG: 20 CAPSULE ORAL at 13:41

## 2024-08-19 RX ADMIN — RISPERIDONE 1 MG: 1 TABLET, FILM COATED ORAL at 08:29

## 2024-08-19 NOTE — GROUP NOTE
Group Therapy Note    Date: 2024    Group Start Time: 0800  Group End Time: 830  Group Topic: Activity    [facility] Kisha Weir        Group Therapy Note    Attendees: 8  Activities group/// Dance exercise work out, therapy,what relax individual and  what individual enjoy doing.       Patient's Goal:  ***    Notes:  ***    Status After Intervention:  {Status After Intervention:251504891}    Participation Level: {Participation Level:889494413}    Participation Quality: {[facility] PARTICIPATION QUALITY:960851533}      Speech:  {[facility] CD_SPEECH:18762}      Thought Process/Content: {Thought Process/Content:638117956}      Affective Functioning: {Affective Functionin}      Mood: {Mood:308003700}      Level of consciousness:  {Level of consciousness:076209292}      Response to Learning: {[facility] Responses to Learnin}      Endings: {[facility] Endings:53331}    Modes of Intervention: {[facility] Modes of Intervention:196166888}      Discipline Responsible: {[facility] Multidisciplinary:775359851}      Signature:  Kisha Allen

## 2024-08-19 NOTE — PLAN OF CARE
Problem: Depression  Goal: Will be euthymic at discharge  Description: INTERVENTIONS:  1. Administer medication as ordered  2. Provide emotional support via 1:1 interaction with staff  3. Encourage involvement in milieu/groups/activities  4. Monitor for social isolation  8/18/2024 2129 by Matias Irby RN  Outcome: Progressing  8/18/2024 1018 by Chetna Anne RN  Outcome: Progressing     Problem: Evy  Goal: Will exhibit normal sleep and speech and no impulsivity  Description: INTERVENTIONS:  1. Administer medication as ordered  2. Set limits on impulsive behavior  3. Make attempts to decrease external stimuli as possible  Outcome: Progressing      Unable to assess

## 2024-08-19 NOTE — GROUP NOTE
Group Therapy Note    Date: 8/19/2024    Group Start Time: 1245  Group End Time: 1315  Group Topic: Process Group - Inpatient    [facility]    Homar Solis        Group Therapy Note: This writer facilitated a group where each individual was provided with a handout on \"my strengths and qualities\" and discuss their responses with their peers.     Attendees: 3       Patient's Goal:  to attend group.     Notes:  Pt was encouraged to attend but did not.         Signature:  Homar Solis

## 2024-08-19 NOTE — PLAN OF CARE
Problem: Risk for Elopement  Goal: Patient will not exit the unit/facility without proper excort  Outcome: Progressing     Problem: Depression  Goal: Will be euthymic at discharge  Description: INTERVENTIONS:  1. Administer medication as ordered  2. Provide emotional support via 1:1 interaction with staff  3. Encourage involvement in milieu/groups/activities  4. Monitor for social isolation  8/19/2024 1017 by Toya Soria RN  Outcome: Progressing  8/18/2024 2129 by Matias Irby RN  Outcome: Progressing     Problem: Evy  Goal: Will exhibit normal sleep and speech and no impulsivity  Description: INTERVENTIONS:  1. Administer medication as ordered  2. Set limits on impulsive behavior  3. Make attempts to decrease external stimuli as possible  8/19/2024 1017 by Toya Soria RN  Outcome: Progressing  8/18/2024 2129 by Matias Irby RN  Outcome: Progressing     Problem: Psychosis  Goal: Will report no hallucinations or delusions  Description: INTERVENTIONS:  1. Administer medication as  ordered  2. Assist with reality testing to support increasing orientation  3. Assess if patient's hallucinations or delusions are encouraging self harm or harm to others and intervene as appropriate  Outcome: Progressing     Problem: Behavior  Goal: Pt/Family maintain appropriate behavior and adhere to behavioral management agreement, if implemented  Description: INTERVENTIONS:  1. Assess patient/family's coping skills and  non-compliant behavior (including use of illegal substances)  2. Notify security of behavior or suspected illegal substances which indicate the need for search of the family and/or belongings  3. Encourage verbalization of thoughts and concerns in a socially appropriate manner  4. Utilize positive, consistent limit setting strategies supporting safety of patient, staff and others  5. Encourage participation in the decision making process about the behavioral management agreement  6. If a

## 2024-08-19 NOTE — GROUP NOTE
Group Therapy Note    Date: 8/19/2024    Group Start Time: 0800  Group End Time: 0830  Group Topic: Community Meeting    [facility] AC[facility]    Petra Moran        Group Therapy Note    Attendees: 4/12         Patient's Goal:  patient stated he wanted to see what's going to happen today in relation to being here     Notes:  patient started that he was a little anxious. Patient stated that he was not experiencing any depression, thoughts or self harm, or harm to others     Status After Intervention:  Unchanged    Participation Level: Active Listener    Participation Quality: Attentive      Speech:  normal      Thought Process/Content: Logical      Affective Functioning: Congruent      Mood: Patient stated grateful to see the next day       Level of consciousness:  Attentive      Response to Learning: Able to verbalize current knowledge/experience      Endings: None Reported    Modes of Intervention: Socialization      Discipline Responsible: Behavorial Health Tech      Signature:  PETRA MORAN

## 2024-08-20 PROCEDURE — 1240000000 HC EMOTIONAL WELLNESS R&B

## 2024-08-20 PROCEDURE — 6370000000 HC RX 637 (ALT 250 FOR IP): Performed by: PSYCHIATRY & NEUROLOGY

## 2024-08-20 RX ADMIN — TRAZODONE HYDROCHLORIDE 50 MG: 50 TABLET ORAL at 00:16

## 2024-08-20 RX ADMIN — RISPERIDONE 1 MG: 1 TABLET, FILM COATED ORAL at 08:24

## 2024-08-20 RX ADMIN — RISPERIDONE 1 MG: 1 TABLET, FILM COATED ORAL at 21:58

## 2024-08-20 RX ADMIN — RISPERIDONE 1 MG: 1 TABLET, FILM COATED ORAL at 00:16

## 2024-08-20 RX ADMIN — TRAZODONE HYDROCHLORIDE 50 MG: 50 TABLET ORAL at 21:58

## 2024-08-20 NOTE — GROUP NOTE
Group Therapy Note    Date: 8/20/2024    Group Start Time: 1300  Group End Time: 1340  Group Topic: Process Group - Inpatient    [facility] ACUTE    Temi Pendleton MSW        Group Therapy Note: Facilitator encouraged the group to share something they wish people knew about them. Pt's identified emotions and was educated on the importance of managing stressful emotions. Pt's practiced writing positive affirmations.    Attendees: 6       Patient's Goal:  to attend groups    Notes:  Pt shared that he has a high opinion of himself and that he has a lot of self control. Pt further stated that he is genuine and honest.     Status After Intervention:  Unchanged    Participation Level: Minimal    Participation Quality: Lethargic      Speech:  hesitant      Thought Process/Content: Perseverating      Affective Functioning: Constricted/Restricted      Mood:  Calm      Level of consciousness:  Preoccupied      Response to Learning: Able to verbalize current knowledge/experience, Able to verbalize/acknowledge new learning, Able to retain information, and Progressing to goal      Endings: None Reported    Modes of Intervention: Education, Support, and Socialization      Discipline Responsible: /Counselor      Signature:  ASHLEE MCCARTNEY

## 2024-08-20 NOTE — GROUP NOTE
Group Therapy Note    Date: 8/19/2024    Group Start Time: 1945  Group End Time: 2030  Group Topic: Recreational    [facility] 2 [facility] NON ACUTE    Wendy Koch        Group Therapy Note    Attendees: 7/8     Recreational Therapist facilitated structured leisure skills group to introduce healthy leisure skills as positive way to cope and manage mood.            Notes:  Did not attend group despite encouragement    Discipline Responsible: Recreational Therapist      Signature:  WADE See

## 2024-08-21 VITALS
HEART RATE: 94 BPM | TEMPERATURE: 97.7 F | SYSTOLIC BLOOD PRESSURE: 114 MMHG | OXYGEN SATURATION: 100 % | RESPIRATION RATE: 20 BRPM | WEIGHT: 130 LBS | HEIGHT: 67 IN | DIASTOLIC BLOOD PRESSURE: 79 MMHG | BODY MASS INDEX: 20.4 KG/M2

## 2024-08-21 PROCEDURE — 6370000000 HC RX 637 (ALT 250 FOR IP): Performed by: PSYCHIATRY & NEUROLOGY

## 2024-08-21 RX ORDER — RISPERIDONE 1 MG/1
1 TABLET ORAL 2 TIMES DAILY
Qty: 60 TABLET | Refills: 3 | Status: SHIPPED | OUTPATIENT
Start: 2024-08-21

## 2024-08-21 RX ADMIN — RISPERIDONE 1 MG: 1 TABLET, FILM COATED ORAL at 09:34

## 2024-08-21 ASSESSMENT — PAIN SCALES - GENERAL: PAINLEVEL_OUTOF10: 0

## 2024-08-21 NOTE — BH NOTE
Group Therapy Note    Date: 08/13/24    Group Start Time:2035   Group End Time: 2052  Group Topic: wrap up            Patient was encouraged to participate in group but only sat there.    Patient's Goal:      Notes:      Status After Intervention:  Unchanged    Participation Level: None    Participation Quality: Resistant      Speech:  hesitant      Thought Process/Content: Logical      Affective Functioning: Constricted/Restricted      Mood: anxious      Level of consciousness:  Alert      Response to Learning: Resistant      Endings: None Reported    Modes of Intervention: Support      Discipline Responsible: Behavorial Health Tech      Signature:  Yashira Mayo    
1314- pt yelling in pt's room loudly multiple times. Writer went to assess pt with ERIKA Joanne. Pt stated that \"there are scammers coming through the walls and pulling my testicles.\" Dr. Bar notified and ordered geodon 20mg po prn for agitation, severe anxiety.     1341- Pt has stopped yelling but is mumbling to self and looking over his shoulders on interaction with this writer. Prn geodon 20mg po willingly accepted by pt at this time.   
Affect full. Anxious. Denied having thoughts to self harm. Denied feeling depressed. Compliant with scheduled med. Appetite good;consumed 100%. Of bfkt, in his room. Pt showered and changed his gown, in preparation for his commitment hearing today. Observed with a towel, around his head, and 2 ties around his waist. Pt was committed x 10 days. Attended one group. Q 15 mins checks maintained, for safety.   
Behavioral Health Transition Record to Provider    Patient Name: Doe Thomas  YOB: 1990  Medical Record Number: [id number]  Date of Admission: 8/13/2024  Date of Discharge: 8/21/2024    Attending Provider: Santos Bar MD  Discharging Provider: Dr. Bar  To contact this individual call [phone] and ask the  to page.  If unavailable, ask to be transferred to Behavioral Health Provider on call.  A Behavioral Health Provider will be available on call 24/7 and during holidays.    Primary Care Provider: No primary care provider on file.    No Known Allergies    Reason for Admission: Pt not able to clearly explain why he's in the hospital. He states he was doing PIP work in [place] and the police were in on it. He was in chamber and they interrupted his circuit.     Admission Diagnosis: Psychiatric problem [F99]  Psychosis, unspecified psychosis type (HCC) [F29]    * No surgery found *    Results for orders placed or performed during the hospital encounter of 08/13/24   Rapid Strep Screen    Specimen: Blood Serum; Throat   Result Value Ref Range    Strep A Ag Negative Negative     COVID-19, Rapid    Specimen: Nasopharyngeal   Result Value Ref Range    Source Not provided      SARS-CoV-2, PCR Not Detected Not Detected     Culture, Throat    Specimen: Throat   Result Value Ref Range    Special Requests No Special Requests      Culture Normal respiratory wenceslao/no beta strep isolated     CBC with Auto Differential   Result Value Ref Range    WBC 3.5 (L) 4.1 - 11.1 K/uL    RBC 3.87 (L) 4.10 - 5.70 M/uL    Hemoglobin 11.8 (L) 12.1 - 17.0 g/dL    Hematocrit 34.5 (L) 36.6 - 50.3 %    MCV 89.1 80.0 - 99.0 FL    MCH 30.5 26.0 - 34.0 PG    MCHC 34.2 30.0 - 36.5 g/dL    RDW 12.6 11.5 - 14.5 %    Platelets 288 150 - 400 K/uL    MPV 9.7 8.9 - 12.9 FL    Nucleated RBCs 0.0 0.0  WBC    nRBC 0.00 0.00 - 0.01 K/uL    Neutrophils % 48 32 - 75 %    Lymphocytes % 37 12 - 49 %    Monocytes % 14 (H) 
Behavioral Health Transition Record to Provider    Patient Name: Doe Thomas  YOB: 1990  Medical Record Number: [id number]  Date of Admission: 8/13/2024  Date of Discharge: 8/21/2024    Attending Provider: Santos Bar MD  Discharging Provider: Dr. Bar  To contact this individual call [phone] and ask the  to page.  If unavailable, ask to be transferred to Behavioral Health Provider on call.  A Behavioral Health Provider will be available on call 24/7 and during holidays.    Primary Care Provider: No primary care provider on file.    No Known Allergies    Reason for Admission: Pt not able to clearly explain why he's in the hospital. He states he was doing PIP work in [place] and the police were in on it. He was in chamber and they interrupted his circuit.     Admission Diagnosis: Psychiatric problem [F99]  Psychosis, unspecified psychosis type (HCC) [F29]    * No surgery found *    Results for orders placed or performed during the hospital encounter of 08/13/24   Rapid Strep Screen    Specimen: Blood Serum; Throat   Result Value Ref Range    Strep A Ag Negative Negative     COVID-19, Rapid    Specimen: Nasopharyngeal   Result Value Ref Range    Source Not provided      SARS-CoV-2, PCR Not Detected Not Detected     Culture, Throat    Specimen: Throat   Result Value Ref Range    Special Requests No Special Requests      Culture Normal respiratory wenceslao/no beta strep isolated     CBC with Auto Differential   Result Value Ref Range    WBC 3.5 (L) 4.1 - 11.1 K/uL    RBC 3.87 (L) 4.10 - 5.70 M/uL    Hemoglobin 11.8 (L) 12.1 - 17.0 g/dL    Hematocrit 34.5 (L) 36.6 - 50.3 %    MCV 89.1 80.0 - 99.0 FL    MCH 30.5 26.0 - 34.0 PG    MCHC 34.2 30.0 - 36.5 g/dL    RDW 12.6 11.5 - 14.5 %    Platelets 288 150 - 400 K/uL    MPV 9.7 8.9 - 12.9 FL    Nucleated RBCs 0.0 0.0  WBC    nRBC 0.00 0.00 - 0.01 K/uL    Neutrophils % 48 32 - 75 %    Lymphocytes % 37 12 - 49 %    Monocytes % 14 (H) 
Behavioral Health Treatment Team Note     Patient goal(s) for today: None voiced  Treatment team focus/goals: Medication management, group therapy, discharge planning    Progress note: Pt experiencing psychosis. Per nursing note: 1314- pt yelling in pt's room loudly multiple times. Writer went to assess pt with JEROD Joanne. Pt stated that \"there are scammers coming through the walls and pulling my testicles.\" Dr. Bar notified and ordered geodon 20mg po prn for agitation, severe anxiety.      1341- Pt has stopped yelling but is mumbling to self and looking over his shoulders on interaction with this writer. Prn geodon 20mg po willingly accepted by pt at this time.    Pt sleeping during rounds and woke up to prompting by this writer. However, pt is very groggy and encouraged to continue resting.   An inpatient level of care is needed to further stabilize pt on medication.     LOS:  6  Expected LOS: TDO 8/25    Insurance info/prescription coverage:  Medicaid  Date of last family contact:  None  Family requesting physician contact today:  No  Discharge plan:  Step down to community stabilization  Guns in the home:  No  Outpatient provider(s):  [facility]    Participating treatment team members: DIYA Luciano MSW  
Behavioral Health Treatment Team Note     Patient goal(s) for today: None voiced  Treatment team focus/goals: Medication management, group therapy, discharge planning    Progress note: Pt presents unkempt. He was heading to group but stopped to greet this writer. Pt asked how long it will take to \"find a place\". Writer explained crisis stabilization and asked if pt had any source of income for rent. Pt denied and acknowledged understanding of crisis services. Writer gauged pt's understanding of his commitment hearing. Pt contemplated his 10 day TDO and said, \"Maybe Wednesday I can go?\" Writer educated pt on discharge criteria and that we'll take it one day at a time. Pt said, \"ok\". An inpatient level of care is needed to further stabilize pt on medication and coordinate a safe discharge plan.    LOS:  3  Expected LOS: TDO 8/25/24    Insurance info/prescription coverage:  Aetna  Date of last family contact:  None authorized  Family requesting physician contact today:  No  Discharge plan:  Step down to crisis stabilization  Guns in the home:  No  Outpatient provider(s):  To be coordinated     Participating treatment team members: DIYA Luciano MSW  
Behavioral Health Treatment Team Note     Patient goal(s) for today: \"Food, good sex and money\"  Treatment team focus/goals: Medication management, group therapy, discharge planning    Progress note: Pt not able to clearly explain why he's in the hospital. He states he was doing PIP work in [place] and the police were in on it. He was in the chamber and they interrupted his circuit. Pt states that [place] belongs to [facility] and [facility]. Pt states he is waiting on cash from his job so he can't be in the hospital long. Pt states she has a wife of 5 years named Alba Krause Ludin who works for [employer]. He could not provide me with any contact information. When asked if he knows how to get in touch with her he said, \"ya, she probably moved on.\" Pt further stated he has brothers and sisters here and that [employer] sent him to the [facility] [facility] in [place]. Pt believes in the katelin of the beast and tyrants. Writer discussed hospital goals and the type of resources we can provide. Pt is receptive to stepping down to residential crisis post discharge. He has a hearing with the  on Friday to determine the need for inpatient treatment. Pt states he does not take medicine but he is taking prescribed medication here. An inpatient level of care is needed to coordinate a safe discharge plan.     LOS:  2  Expected LOS: 5-7 days    Insurance info/prescription coverage:  Aetna  Date of last family contact:  None authorized  Family requesting physician contact today:  No  Discharge plan:  Step down to crisis stab  Guns in the home:  No  Outpatient provider(s):  To be coordinated    Participating treatment team members: DIYA Luciano MSW  
Behavioral Health Treatment Team Note     Patient goal(s) for today: \"To stay focused and keep wise\"  Treatment team focus/goals: Medication management, group therapy, discharge planning    Progress note: Pt presents in the dayroom watching television. He is pleasant on approach, appears to be responding to internal stimuli. However, pt denies SI/HI/AVH. Pt's goal is to stay focused and wise. Pt inquired about discharge and writer informed the plan is to step down to crisis stabilization with [facility] tomorrow as long as he continues to participate in treatment and has met discharge criteria. Pt is receptive. An inpatient level of care is needed to coordinate a safe discharge.     LOS:  7  Expected LOS: TDO 8/25    Insurance info/prescription coverage:  Medicaid  Date of last family contact:  None authorized  Family requesting physician contact today:  No  Discharge plan:  Step down to crisis stabilization  Guns in the home:  No  Outpatient provider(s):  [facility]    Participating treatment team members: DIYA Luciano MSW  
Client is pleasant and cooperative.Alert and oriented x 3.Client has a good appetite and reports sleeping well.Denies feeling suicidaL.Quiet and withdrawn.Minimum interaction noted between peers.Attends some of the groups.No verbal complaints.Remains on close observation for safety.  
DAY SHIFT    Pt up ad kaya on unit. Pt presents with bizarre affect  and disorganized thoughts. Pt has poor hygiene and is unkept smells of body odor. Pt talks with this writer about his family that lives in [place] and how he ended up in [place]. Pt states he had some business to take care of in [place] and then came to the ED. Pt does endorses some depression nit states that it is due to being in here. Pt explained about his hearing being tomorrow. Pt denies SI/HI. Pt denies AVH but is responding to VH. Pt will also yell out at random times in his room. Pt takes medication without difficulty. Pt attending group. Close observations continued to ensure pt safety.   
DISCHARGE SUMMARY    NAME:Doe Thomas  : 1990  MRN: [id number]    The patient Doe Thomas exhibits the ability to control behavior in a less restrictive environment.  Patient's level of functioning is improving.  No assaultive/destructive behavior has been observed for the past 24 hours.  No suicidal/homicidal threat or behavior has been observed for the past 24 hours.  There is no evidence of serious medication side effects.  Patient has not been in physical or protective restraints for at least the past 24 hours.    If weapons involved, how are they secured? N/a    Is patient aware of and in agreement with discharge plan? Yes    Arrangements for medication:  Prescriptions printed    Copy of discharge instructions to provider?:  Yes    Arrangements for transportation home:  Pt will be picked up by staff from [facility]    Keep all follow up appointments as scheduled, continue to take prescribed medications per physician instructions.  Mental health crisis number:  [phone]      Mental Health Emergency WARM LINE      [phone] ([phone]      M-F: 9am to 9pm      Sat & Sun: 5pm - 9pm  National suicide prevention lines:                             8-064-TNCPRUW (2-216-269-4185)       2-670-524-TALK ([phone])    Crisis Text Line:  Text HOME to 947466  
HEARING DISPOSITION    Special Justice: Judge Amaya   : Mr. Henson   Committed: 10 Days   Expires: 8/25/2024  
HEARING DISPOSITION    Special Justice: Judge Amaya   : Mr. Hernandez   Committed: 10 Days  Expires:  8/23/2024   
Nurse Note:    Patient observed briefly in the murray to receive snack; no report of SI, HI, A/V hallucinations. No report of anxiety or depression. Patient appears to be responding to internal stimuli and is withdrawn. Poor eye contact and minimal interaction with this writer, but is pleasant and cooperative. Patient is medication compliant and currently resting quietly with eyes closed. Will continue to monitor for safety.  
PSYCHOSOCIAL ASSESSMENT  :Patient identifying info:   Doe Thomas is a 34 y.o., male admitted 8/13/2024  9:50 AM     Presenting problem and precipitating factors: Pt not able to clearly explain why he's in the hospital. He states he was doing PIP work in [place] and the police were in on it. He was in chamber and they interrupted his circuit. Pt [facility] [place] [facility]. Pt states he is waiting on cash from his job so he can't be in the hospital long. Pt states she has a wife of 5 years named Alba Noland who works for [employer]. Pt further stated he has brothers and sisters here and that [employer] sent him to the [facility] in [place]. Pt believes in the katelin of the beast and tyrants.     Mental status assessment: Pt presents with disorganized and delusional thinking. His speech is similar to a Petr accent. Pt is oriented to person and place. His eye contact is fair. He is responding to internal stimuli. Pt lacks insight and his judgement is psychologically impaired.    Strengths/Recreation/Coping Skills: Pt likes to exercise his brain    Collateral information: None    Current psychiatric /substance abuse providers and contact info: None    Previous psychiatric/substance abuse providers and response to treatment: Unclear    Family history of mental illness or substance abuse: Unk    Substance abuse history:    Social History     Tobacco Use    Smoking status: Never    Smokeless tobacco: Never   Substance Use Topics    Alcohol use: Never       History of biomedical complications associated with substance abuse: n/a    Patient's current acceptance of treatment or motivation for change: Pt said he wants food    Family constellation: Pt states he has a wife and several siblings    Is significant other involved? unk    Describe support system: unk    Describe living arrangements and home environment: Pt is homeless    GUARDIAN/POA: 
Patient has been asleep until 11pm. Patient woke was given something to drink and a snack . Patient was talking loud  and standing in one place There have been some probable  compliance issues here. I have discussed with him the great importance of following the treatment plan exactly as directed in order to achieve a good medical outcome. With medications   
Patient isolative to his room.  Limited interaction with staff or peers.  At shift change, patient told BHT his throat was very painful.  Contacted Dr. Ham, medical consulting physician, and received new orders.  Pleasant and cooperative.  Inquired when he would be meeting with the .  Advised hearing date is Friday.  Presents paranoid, suspicious, and guarded with fair eye contact.  Denies anxiety, depression, SI, HI, or AVH, however, he presents with thought blocking and appears to be responding to internal stimuli.  Accepted medication without difficulty.  Not attending groups or performing personal hygiene.  Met with Dr. Ham this afternoon for H+P.  No c/o of sore throat.  Treatment plan ongoing.  Remains on close observation every 15 minutes for patient safety.                
Patient observed up on unit attending music group.  Pleasant on approach with brighter affect, however, appears to still be responding to internal stimuli.  Denies anxiety, depression, SI, HI, or AVH.  Accepted medication without difficulty.  No side effects reported.  After bedtime, while in the bathroom, patient yelling very loud.  Upon assessment, patient had returned to bed and stated he was alright.  Collaboration with staff indicated he has done this on past shifts but normally early in the morning.  Treatment plan ongoing.  Remains on close observation every 15 minutes for patient safety.                
Patient remains on close observation.  Patient has been alert, oriented to self and place, cooperative but mostly avoidant.  Patient complained of an ankle issue but did not request any medications or care.  Patient denied he injured his ankle.  Patient has not voiced depression, anxiety, SI or HI.  Medication compliant.  Continue to assess.  
Patient remains on close observation.  Patient has been alert, oriented, cooperative, withdrawn.  Patient is minimally invested in the therapeutic conversation.  Patient has not voiced depression, anxiety, SI or HI.  Medication compliant.  Continue to assess.  
Pt cooperative and pleasant with staff upon approach. Pt has accepted and tolerated meds as ordered. Pt preoccupied in thought during 1:1, though he denied hearing voices when asked. Pt does come out of his room at intervals  for meals and to watch TV. Pt encouraged to seek staff as needed.   
Pt discharged via ambulatory at 1300 denying SI/HI or physical discomfort. Verified receipt of personal and safe items. Verbalized understanding of discharge instructions, prescription  at [facility] pharmacy and follow up care appointments. Pt escorted to hospital entrance, picked up by [facility].    
Pt is cooperative with writer during 1:1, denied any concerns. Pt attended groups as provided with interactions. Pt denied SI/HI, without noted behaviors. Pt denied hallucinations and delusions. Without reported grandeur statements during interactions. Pt encouraged to seek staff as needed.    
Pt noted up on unit sitting in day room with peers,presents with a flat affect, denies any SI/HI, pt presents with poor hygiene, asked for personal hygiene products but when I went in to take pt his night meds he was standing in the bathroom with water running and a towel rapped around his head, pt is compliant with medication , denies any pain or discomfort, just concern about his hearing tomorrow and what time will the  get here? Pt remain on close observation for safety.  
Pt up ad kaya on unit mostly isolative to room, but did sit in the dayroom for breakfast. Pt consumed approx 100% of breakfast. Affect is flat. Pt accepted scheduled medications without difficulty and denied the need for any prns at this time. Pt denied depression, anxiety, SI/HI/AVH. Pt denied any pain or physical complaints. Pt inquiring about date of discharge and writer encouraged pt to speak to  and Dr. Bar. Q 15 min checks continued to ensure pt safety on unit.   
The pt has been visible on the unit. He has been sitting, quietly, in the milieu, or ambulating on the unit. While waiting in line for snacks, pt noted staring at the wall and making body gestures, as though he was going to lunge into the wall. The pt seems preoccupied and possible responding to internal stimuli. When in his room, pt noted staring out of the window. He denied having any A/D/SI or A/VH. He requested, \" The horse pill.\"  Pt given scheduled medications w/o difficulty and given prn Trazodone to help with sleep. No c/o pain or discomfort. His affect is flat to constricted. He appears suspicious and unpredictable. He, however, has remained calm and cooperative. He is difficult to understand due to his accent, rambling, and loose association. The pt stated that he had his hearing today and he seems unsure of the commitment days. He accepted snacks and something to drink. No c/o pain or discomfort.    The pt is resting quietly in bed with his eyes closed. No distress noted or voiced. Close observation maintained for safety.     
This 34 year old  male is being admitted to Behavioral Health under the professional services of  with an admitting diagnosis of psychosis nos.Client presents a flat affect and depressed mood.He exhibits loose associations,delusional thinking,flight of ideas and poor insight and judgement.He reports that he is the queen of [place]\"s son.Denies any thoughts to harm himself or others.Per pre screening form,he has been walking in and out of traffic yelling to the cruz.Verbalizes he was waiting for the money to give queen Lara\"s daughter.Talking about the katelin of the beast is real and is on the way.Verbalizes that he needs a ticket to get back to [place] to rule the world.Preoccupied with inner thoughts.He refused to sign any consents.Client has a foul body odor.Asked why he had been incarcerated and wanted to know what his charges are and if he needs a  to represent him.Client will be placed on close observation for safety.  
remains on close observation for safety.     The pt           
Pt admitted to  secondary to weakness, dysuria, and increase urine frequency. Pt with a hx of bladder ca with resection and uterine ca with hysterectomy.

## 2024-08-21 NOTE — PLAN OF CARE
Problem: Discharge Planning  Goal: Discharge to home or other facility with appropriate resources  Outcome: Progressing     Problem: Depression  Goal: Will be euthymic at discharge  Description: INTERVENTIONS:  1. Administer medication as ordered  2. Provide emotional support via 1:1 interaction with staff  3. Encourage involvement in milieu/groups/activities  4. Monitor for social isolation  Outcome: Progressing     Problem: Psychosis  Goal: Will report no hallucinations or delusions  Description: INTERVENTIONS:  1. Administer medication as  ordered  2. Assist with reality testing to support increasing orientation  3. Assess if patient's hallucinations or delusions are encouraging self harm or harm to others and intervene as appropriate  Outcome: Progressing     Problem: Behavior  Goal: Pt/Family maintain appropriate behavior and adhere to behavioral management agreement, if implemented  Description: INTERVENTIONS:  1. Assess patient/family's coping skills and  non-compliant behavior (including use of illegal substances)  2. Notify security of behavior or suspected illegal substances which indicate the need for search of the family and/or belongings  3. Encourage verbalization of thoughts and concerns in a socially appropriate manner  4. Utilize positive, consistent limit setting strategies supporting safety of patient, staff and others  5. Encourage participation in the decision making process about the behavioral management agreement  6. If a visitor's behavior poses a threat to safety call refer to organization policy.  7. Initiate consult with , Psychosocial CNS, Spiritual Care as appropriate  8/21/2024 1003 by Huong Truner, RN  Outcome: Progressing  8/20/2024 2006 by Deepti Badillo, RN  Outcome: Progressing     Problem: Anxiety  Goal: Will report anxiety at manageable levels  Description: INTERVENTIONS:  1. Administer medication as ordered  2. Teach and rehearse alternative coping

## 2024-08-21 NOTE — GROUP NOTE
Group Therapy Note    Date: 8/20/2024    Group Start Time: 1945  Group End Time: 2040  Group Topic: Recreational    [facility] 2 BH NON ACUTE    Wendy Koch        Group Therapy Note    Attendees: 5/7      Recreational Therapist facilitated structured leisure skills group to introduce healthy leisure skills as positive way to cope and manage mood.        Patient's Goal:  Client will learn and demonstrate improved self management skills     Notes: Attended group and listened to songs with peers.  Pt was receptive to intervention and responded to prompts from staff. Attended entire session and was attentive during group.    Status After Intervention:  Improved    Participation Level: Active Listener    Participation Quality: Appropriate      Speech:  normal      Thought Process/Content: Logical      Affective Functioning: Congruent      Mood:  calm       Level of consciousness:  Alert      Response to Learning: Progressing to goal      Endings: None Reported    Modes of Intervention: Activity      Discipline Responsible: Recreational Therapist      Signature:  WADE See

## 2024-08-21 NOTE — PLAN OF CARE
Problem: Behavior  Goal: Pt/Family maintain appropriate behavior and adhere to behavioral management agreement, if implemented  Description: INTERVENTIONS:  1. Assess patient/family's coping skills and  non-compliant behavior (including use of illegal substances)  2. Notify security of behavior or suspected illegal substances which indicate the need for search of the family and/or belongings  3. Encourage verbalization of thoughts and concerns in a socially appropriate manner  4. Utilize positive, consistent limit setting strategies supporting safety of patient, staff and others  5. Encourage participation in the decision making process about the behavioral management agreement  6. If a visitor's behavior poses a threat to safety call refer to organization policy.  7. Initiate consult with , Psychosocial CNS, Spiritual Care as appropriate  Outcome: Progressing     Problem: Evy  Goal: Will exhibit normal sleep and speech and no impulsivity  Description: INTERVENTIONS:  1. Administer medication as ordered  2. Set limits on impulsive behavior  3. Make attempts to decrease external stimuli as possible  Outcome: Progressing     Problem: Sleep Disturbance  Goal: Will exhibit normal sleeping pattern  Description: INTERVENTIONS:  1. Administer medication as ordered  2. Decrease environmental stimuli, including noise, as appropriate  3. Discourage social isolation and naps during the day  Outcome: Progressing

## 2024-08-21 NOTE — PROGRESS NOTES
PSYCHIATRIC PROGRESS NOTE         Patient Name  Doe Thomas   Date of Birth 1990   [facility] [id number]   Medical Record Number  [id number]      Age  34 y.o.   PCP No primary care provider on file.   Admit date:  8/13/2024    Room Number  239/01   [facility]   Date of Service  8/16/2024            HISTORY OF PRESENT ILLNESS/INTERVAL HISTORY:              MENTAL STATUS EXAM & VITALS                    VITALS:     Patient Vitals for the past 24 hrs:   Temp Pulse Resp BP SpO2   08/16/24 1916 98.1 °F (36.7 °C) 100 18 123/71 100 %   08/16/24 0728 98.1 °F (36.7 °C) 91 20 119/81 --     Wt Readings from Last 3 Encounters:   08/13/24 59 kg (130 lb)   08/12/24 59 kg (130 lb)   08/10/24 59 kg (130 lb)     Temp Readings from Last 3 Encounters:   08/16/24 98.1 °F (36.7 °C) (Oral)   08/12/24 98.6 °F (37 °C) (Oral)   08/10/24 98.6 °F (37 °C)     BP Readings from Last 3 Encounters:   08/16/24 123/71   08/12/24 (!) 128/97   08/10/24 106/61     Pulse Readings from Last 3 Encounters:   08/16/24 100   08/12/24 73   08/10/24 63            DATA     LABORATORY DATA:(reviewed/updated 8/16/2024)  No results found for this or any previous visit (from the past 24 hour(s)).   No results found for: \"VALAC\", \"VALP\", \"CARB2\"  No results found for: \"LITHM\"   RADIOLOGY REPORTS:(reviewed/updated 8/16/2024)  No results found.       MEDICATIONS     ALL MEDICATIONS:   Current Facility-Administered Medications   Medication Dose Route Frequency    acetaminophen (TYLENOL) tablet 650 mg  650 mg Oral Q4H PRN    aluminum & magnesium hydroxide-simethicone (MAALOX) 200-200-20 MG/5ML suspension 30 mL  30 mL Oral Q6H PRN    traZODone (DESYREL) tablet 50 mg  50 mg Oral Nightly    hydrOXYzine pamoate (VISTARIL) capsule 50 mg  50 mg Oral TID PRN    risperiDONE (RISPERDAL) tablet 1 mg  1 mg Oral BID      SCHEDULED MEDICATIONS:    traZODone  50 mg Oral Nightly    risperiDONE  1 mg Oral BID           ASSESSMENT & PLAN   Progress note for August 16, 2024 
PSYCHIATRIC PROGRESS NOTE         Patient Name  Doe Thomas   Date of Birth 1990   [facility] [id number]   Medical Record Number  [id number]      Age  34 y.o.   PCP No primary care provider on file.   Admit date:  8/13/2024    Room Number  239/01   [facility]   Date of Service  8/17/2024            HISTORY OF PRESENT ILLNESS/INTERVAL HISTORY:              MENTAL STATUS EXAM & VITALS                    VITALS:     Patient Vitals for the past 24 hrs:   Temp Pulse Resp BP SpO2   08/17/24 1913 98.3 °F (36.8 °C) 79 18 112/78 --   08/17/24 0733 98 °F (36.7 °C) 100 18 114/72 98 %     Wt Readings from Last 3 Encounters:   08/13/24 59 kg (130 lb)   08/12/24 59 kg (130 lb)   08/10/24 59 kg (130 lb)     Temp Readings from Last 3 Encounters:   08/17/24 98.3 °F (36.8 °C) (Oral)   08/12/24 98.6 °F (37 °C) (Oral)   08/10/24 98.6 °F (37 °C)     BP Readings from Last 3 Encounters:   08/17/24 112/78   08/12/24 (!) 128/97   08/10/24 106/61     Pulse Readings from Last 3 Encounters:   08/17/24 79   08/12/24 73   08/10/24 63            DATA     LABORATORY DATA:(reviewed/updated 8/17/2024)  No results found for this or any previous visit (from the past 24 hour(s)).   No results found for: \"VALAC\", \"VALP\", \"CARB2\"  No results found for: \"LITHM\"   RADIOLOGY REPORTS:(reviewed/updated 8/17/2024)  No results found.       MEDICATIONS     ALL MEDICATIONS:   Current Facility-Administered Medications   Medication Dose Route Frequency    acetaminophen (TYLENOL) tablet 650 mg  650 mg Oral Q4H PRN    aluminum & magnesium hydroxide-simethicone (MAALOX) 200-200-20 MG/5ML suspension 30 mL  30 mL Oral Q6H PRN    traZODone (DESYREL) tablet 50 mg  50 mg Oral Nightly    hydrOXYzine pamoate (VISTARIL) capsule 50 mg  50 mg Oral TID PRN    risperiDONE (RISPERDAL) tablet 1 mg  1 mg Oral BID      SCHEDULED MEDICATIONS:    traZODone  50 mg Oral Nightly    risperiDONE  1 mg Oral BID           ASSESSMENT & PLAN   Progress note for August 17, 2024 patient 
PSYCHIATRIC PROGRESS NOTE         Patient Name  Doe Thomas   Date of Birth 1990   [facility] [id number]   Medical Record Number  [id number]      Age  34 y.o.   PCP No primary care provider on file.   Admit date:  8/13/2024    Room Number  239/01   [facility]   Date of Service  8/19/2024            HISTORY OF PRESENT ILLNESS/INTERVAL HISTORY:              MENTAL STATUS EXAM & VITALS                    VITALS:     Patient Vitals for the past 24 hrs:   Temp Pulse Resp BP SpO2   08/18/24 1908 97.9 °F (36.6 °C) 94 16 109/74 99 %     Wt Readings from Last 3 Encounters:   08/13/24 59 kg (130 lb)   08/12/24 59 kg (130 lb)   08/10/24 59 kg (130 lb)     Temp Readings from Last 3 Encounters:   08/18/24 97.9 °F (36.6 °C) (Oral)   08/12/24 98.6 °F (37 °C) (Oral)   08/10/24 98.6 °F (37 °C)     BP Readings from Last 3 Encounters:   08/18/24 109/74   08/12/24 (!) 128/97   08/10/24 106/61     Pulse Readings from Last 3 Encounters:   08/18/24 94   08/12/24 73   08/10/24 63            DATA     LABORATORY DATA:(reviewed/updated 8/19/2024)  No results found for this or any previous visit (from the past 24 hour(s)).   No results found for: \"VALAC\", \"VALP\", \"CARB2\"  No results found for: \"LITHM\"   RADIOLOGY REPORTS:(reviewed/updated 8/19/2024)  No results found.       MEDICATIONS     ALL MEDICATIONS:   Current Facility-Administered Medications   Medication Dose Route Frequency    acetaminophen (TYLENOL) tablet 650 mg  650 mg Oral Q4H PRN    aluminum & magnesium hydroxide-simethicone (MAALOX) 200-200-20 MG/5ML suspension 30 mL  30 mL Oral Q6H PRN    traZODone (DESYREL) tablet 50 mg  50 mg Oral Nightly    hydrOXYzine pamoate (VISTARIL) capsule 50 mg  50 mg Oral TID PRN    risperiDONE (RISPERDAL) tablet 1 mg  1 mg Oral BID      SCHEDULED MEDICATIONS:    traZODone  50 mg Oral Nightly    risperiDONE  1 mg Oral BID           ASSESSMENT & PLAN   Progress note for August 18, 2024 patient is seen for follow-up chart reviewed spoke with 
PSYCHIATRIC PROGRESS NOTE         Patient Name  Doe Thomas   Date of Birth 1990   [facility] [id number]   Medical Record Number  [id number]      Age  34 y.o.   PCP No primary care provider on file.   Admit date:  8/13/2024    Room Number  239/01   [facility]   Date of Service  8/15/2024            HISTORY OF PRESENT ILLNESS/INTERVAL HISTORY:              MENTAL STATUS EXAM & VITALS                    VITALS:     Patient Vitals for the past 24 hrs:   Temp Pulse Resp BP SpO2   08/15/24 1910 98.1 °F (36.7 °C) 73 18 127/83 100 %   08/15/24 0724 97.7 °F (36.5 °C) 98 22 (!) 136/99 100 %     Wt Readings from Last 3 Encounters:   08/13/24 59 kg (130 lb)   08/12/24 59 kg (130 lb)   08/10/24 59 kg (130 lb)     Temp Readings from Last 3 Encounters:   08/15/24 98.1 °F (36.7 °C) (Oral)   08/12/24 98.6 °F (37 °C) (Oral)   08/10/24 98.6 °F (37 °C)     BP Readings from Last 3 Encounters:   08/15/24 127/83   08/12/24 (!) 128/97   08/10/24 106/61     Pulse Readings from Last 3 Encounters:   08/15/24 73   08/12/24 73   08/10/24 63            DATA     LABORATORY DATA:(reviewed/updated 8/15/2024)  Recent Results (from the past 24 hour(s))   Basic Metabolic Panel    Collection Time: 08/15/24 10:40 AM   Result Value Ref Range    Sodium 138 136 - 145 mmol/L    Potassium 3.6 3.5 - 5.1 mmol/L    Chloride 107 97 - 108 mmol/L    CO2 27 21 - 32 mmol/L    Anion Gap 4 (L) 5 - 15 mmol/L    Glucose 72 65 - 100 mg/dL    BUN 10 6 - 20 mg/dL    Creatinine 1.27 0.70 - 1.30 mg/dL    BUN/Creatinine Ratio 8 (L) 12 - 20      Est, Glom Filt Rate 75 >60 ml/min/1.73m2    Calcium 9.4 8.5 - 10.1 mg/dL      No results found for: \"VALAC\", \"VALP\", \"CARB2\"  No results found for: \"LITHM\"   RADIOLOGY REPORTS:(reviewed/updated 8/15/2024)  No results found.       MEDICATIONS     ALL MEDICATIONS:   Current Facility-Administered Medications   Medication Dose Route Frequency    acetaminophen (TYLENOL) tablet 650 mg  650 mg Oral Q4H PRN    aluminum & magnesium 
Progress Note  Date:2024       Room:[facility]  Patient Name:Doe Thomas     YOB: 1990     Age:34 y.o.        *ATTENTION:  This note has been created by a medical student for educational purposes only.  Please do not refer to the content of this note for clinical decision-making, billing, or other purposes.  Please see attending physician’s note to obtain clinical information on this patient.*          Subjective    Subjective     Pt appeared calm but confused. Pt stated that he wanted to go to a crisis stabilization center and wanted to leave the unit. Pt claimed to be eating and sleeping well. No active SI/HI/AVH mentioned. Pt continues to lack insight and judgement. Pt claims that he is related to Moreland Lara. Pt continues to have fixed delusions.     Review of Systems  Objective         Vitals Last 24 Hours:  TEMPERATURE:  Temp  Av.1 °F (36.7 °C)  Min: 98 °F (36.7 °C)  Max: 98.1 °F (36.7 °C)  RESPIRATIONS RANGE: Resp  Av  Min: 18  Max: 18  PULSE OXIMETRY RANGE: SpO2  Av %  Min: 98 %  Max: 100 %  PULSE RANGE: Pulse  Av  Min: 100  Max: 100  BLOOD PRESSURE RANGE: Systolic (24hrs), Av , Min:114 , Max:123   ; Diastolic (24hrs), Av, Min:71, Max:72    I/O (24Hr):  No intake or output data in the 24 hours ending 24 1610  Objective  Labs/Imaging/Diagnostics    Labs:  CBC:No results for input(s): \"WBC\", \"RBC\", \"HGB\", \"HCT\", \"MCV\", \"RDW\", \"PLT\" in the last 72 hours.  CHEMISTRIES:  Recent Labs     08/15/24  1040      K 3.6      CO2 27   BUN 10   CREATININE 1.27   GLUCOSE 72     PT/INR:No results for input(s): \"PROTIME\", \"INR\" in the last 72 hours.  APTT:No results for input(s): \"APTT\" in the last 72 hours.  LIVER PROFILE:No results for input(s): \"AST\", \"ALT\", \"BILIDIR\", \"BILITOT\", \"ALKPHOS\" in the last 72 hours.    Imaging Last 24 Hours:  No results found.  Assessment//Plan           Hospital Problems             Last Modified POA    * (Principal) Psychosis, 
Spiritual Health Assessment/Progress Note  [facility]    Initial Encounter,  ,  ,      Name: Doe Thomas MRN: [id number]    Age: 34 y.o.     Sex: male   Language: English   Denominational: Baptist   Psychosis, unspecified psychosis type (HCC)     Date: 8/20/2024            Total Time Calculated: 10 min              Spiritual Assessment began in [facility] 2 [facility] ACUTE        Referral/Consult From: Rounding   Encounter Overview/Reason: Initial Encounter  Service Provided For: Patient    Yesenia, Belief, Meaning:   Patient identifies as spiritual, is connected with a yesenia tradition or spiritual practice, and has beliefs or practices that help with coping during difficult times  Family/Friends No family/friends present      Importance and Influence:  Patient has spiritual/personal beliefs that influence decisions regarding their health  Family/Friends no family/friends present    Community:  Patient feels well-supported. Support system includes: Children and Extended family  Family/Friends Other: none    Assessment and Plan of Care:     Patient Interventions include: Facilitated expression of thoughts and feelings, Explored spiritual coping/struggle/distress and theological reflection, Affirmed coping skills/support systems, and Engaged in life review and/or legacy  Family/Friends Interventions include: Other: none    Patient Plan of Care: Spiritual Care available upon further referral  Family/Friends Plan of Care: Other: none     is visiting for an in initial spiritual assessment with the patient in 2S. He shared about his present coping and his goals for work so he can support his children. He is Baptist and shared about his belief in God's care for him.  engaged him in spiritual dialogue. He requested to pray the Lord's Prayer/Our Father with the .     Electronically signed by PARDEEP MERRILL on 8/20/2024 at 11:32 AM    
2024 patient seen for follow-up chart reviewed discussed in the treatment team patient is out of his room spending mostly in the day room with the peers eating well and sleeping well compliant with medication conversation is much more focused with the organized however still experiencing in her stimulation denies suicidal thought or homicidal thought no hallucination still delusional at this point still we do not know much about his any prior psychiatric treatment in the support system where he was living he and  working on getting him into the a crisis stabilization unit he is receptive to the continued inpatient level of care is needed for appropriate safe discharge planning thank you vital signs pulse 99 blood pressure 116/69 temperature 98.1 respirations 17 O2 saturation 100% no side effects noted  Continue close observation  Discussed meds  Provided support, psycho edu  Discussed with staff in the treatment team, the progress made in therapy, psychosocial needs and nursing concerns.    The following regarding medications was addressed during rounds with patient:   the risks and benefits of the proposed medication.   The patient was given the opportunity to ask questions.   Informed consent given to the use of the above medications.     Obtain psychiatric records from previous [facility] hospitals to further elucidate the nature of patient's psychopathology and review once available.    Gather additional collateral information from friends, family and o/p treatment team to further elucidate the nature of patient's psychopathology and baselline level of psychiatric functioning.         I certify that this patient's inpatient psychiatric hospital services continue to be, required for treatment that could reasonably be expected to improve the patient's condition and that the patient continues to need, on a daily basis, active treatment furnished directly by or requiring the supervision of inpatient

## 2024-08-28 ENCOUNTER — HOSPITAL ENCOUNTER (EMERGENCY)
Facility: HOSPITAL | Age: 34
Discharge: PSYCHIATRIC OR SUBSTANCE USE FACILITY | End: 2024-08-28
Attending: STUDENT IN AN ORGANIZED HEALTH CARE EDUCATION/TRAINING PROGRAM
Payer: MEDICAID

## 2024-08-28 ENCOUNTER — HOSPITAL ENCOUNTER (EMERGENCY)
Facility: HOSPITAL | Age: 34
Discharge: HOME | End: 2024-08-28

## 2024-08-28 VITALS
DIASTOLIC BLOOD PRESSURE: 61 MMHG | HEART RATE: 66 BPM | TEMPERATURE: 98 F | OXYGEN SATURATION: 100 % | SYSTOLIC BLOOD PRESSURE: 104 MMHG | RESPIRATION RATE: 16 BRPM

## 2024-08-28 DIAGNOSIS — F23 ACUTE PSYCHOSIS (HCC): Primary | ICD-10-CM

## 2024-08-28 LAB
ALBUMIN SERPL-MCNC: 3.6 G/DL (ref 3.5–5)
ALBUMIN/GLOB SERPL: 0.9 (ref 1.1–2.2)
ALP SERPL-CCNC: 53 U/L (ref 45–117)
ALT SERPL-CCNC: 48 U/L (ref 12–78)
AMPHET UR QL SCN: NEGATIVE
ANION GAP SERPL CALC-SCNC: 1 MMOL/L (ref 5–15)
APPEARANCE UR: CLEAR
AST SERPL W P-5'-P-CCNC: ABNORMAL U/L (ref 15–37)
BACTERIA URNS QL MICRO: NEGATIVE /HPF
BARBITURATES UR QL SCN: NEGATIVE
BASOPHILS # BLD: 0 K/UL (ref 0–0.1)
BASOPHILS NFR BLD: 0 % (ref 0–1)
BENZODIAZ UR QL: NEGATIVE
BILIRUB SERPL-MCNC: 1 MG/DL (ref 0.2–1)
BILIRUB UR QL: NEGATIVE
BUN SERPL-MCNC: 18 MG/DL (ref 6–20)
BUN/CREAT SERPL: 12 (ref 12–20)
CA-I BLD-MCNC: 9.1 MG/DL (ref 8.5–10.1)
CANNABINOIDS UR QL SCN: NEGATIVE
CHLORIDE SERPL-SCNC: 108 MMOL/L (ref 97–108)
CO2 SERPL-SCNC: 29 MMOL/L (ref 21–32)
COCAINE UR QL SCN: NEGATIVE
COLOR UR: ABNORMAL
CREAT SERPL-MCNC: 1.48 MG/DL (ref 0.7–1.3)
DIFFERENTIAL METHOD BLD: ABNORMAL
EOSINOPHIL # BLD: 0 K/UL (ref 0–0.4)
EOSINOPHIL NFR BLD: 0 % (ref 0–7)
EPITH CASTS URNS QL MICRO: ABNORMAL /LPF
ERYTHROCYTE [DISTWIDTH] IN BLOOD BY AUTOMATED COUNT: 13 % (ref 11.5–14.5)
ETHANOL SERPL-MCNC: <10 MG/DL (ref 0–0.08)
GLOBULIN SER CALC-MCNC: 4 G/DL (ref 2–4)
GLUCOSE SERPL-MCNC: 102 MG/DL (ref 65–100)
GLUCOSE UR STRIP.AUTO-MCNC: NEGATIVE MG/DL
HCT VFR BLD AUTO: 35.2 % (ref 36.6–50.3)
HGB BLD-MCNC: 12 G/DL (ref 12.1–17)
HGB UR QL STRIP: ABNORMAL
IMM GRANULOCYTES # BLD AUTO: 0 K/UL (ref 0–0.04)
IMM GRANULOCYTES NFR BLD AUTO: 1 % (ref 0–0.5)
KETONES UR QL STRIP.AUTO: NEGATIVE MG/DL
LEUKOCYTE ESTERASE UR QL STRIP.AUTO: NEGATIVE
LYMPHOCYTES # BLD: 1.1 K/UL (ref 0.8–3.5)
LYMPHOCYTES NFR BLD: 26 % (ref 12–49)
Lab: NORMAL
MCH RBC QN AUTO: 31.3 PG (ref 26–34)
MCHC RBC AUTO-ENTMCNC: 34.1 G/DL (ref 30–36.5)
MCV RBC AUTO: 91.9 FL (ref 80–99)
METHADONE UR QL: NEGATIVE
MONOCYTES # BLD: 0.4 K/UL (ref 0–1)
MONOCYTES NFR BLD: 11 % (ref 5–13)
MUCOUS THREADS URNS QL MICRO: ABNORMAL /LPF
NEUTS SEG # BLD: 2.6 K/UL (ref 1.8–8)
NEUTS SEG NFR BLD: 62 % (ref 32–75)
NITRITE UR QL STRIP.AUTO: NEGATIVE
NRBC # BLD: 0 K/UL (ref 0–0.01)
NRBC BLD-RTO: 0 PER 100 WBC
OPIATES UR QL: NEGATIVE
PCP UR QL: NEGATIVE
PH UR STRIP: 5 (ref 5–8)
PLATELET # BLD AUTO: 282 K/UL (ref 150–400)
PMV BLD AUTO: 10 FL (ref 8.9–12.9)
POTASSIUM SERPL-SCNC: ABNORMAL MMOL/L (ref 3.5–5.1)
PROT SERPL-MCNC: 7.6 G/DL (ref 6.4–8.2)
PROT UR STRIP-MCNC: NEGATIVE MG/DL
RBC # BLD AUTO: 3.83 M/UL (ref 4.1–5.7)
RBC #/AREA URNS HPF: ABNORMAL /HPF (ref 0–5)
SODIUM SERPL-SCNC: 138 MMOL/L (ref 136–145)
SP GR UR REFRACTOMETRY: 1.02 (ref 1–1.03)
URINE CULTURE IF INDICATED: ABNORMAL
UROBILINOGEN UR QL STRIP.AUTO: 2 EU/DL (ref 0.1–1)
WBC # BLD AUTO: 4.1 K/UL (ref 4.1–11.1)
WBC URNS QL MICRO: ABNORMAL /HPF (ref 0–4)

## 2024-08-28 PROCEDURE — 80307 DRUG TEST PRSMV CHEM ANLYZR: CPT

## 2024-08-28 PROCEDURE — 99285 EMERGENCY DEPT VISIT HI MDM: CPT

## 2024-08-28 PROCEDURE — 80053 COMPREHEN METABOLIC PANEL: CPT

## 2024-08-28 PROCEDURE — 85025 COMPLETE CBC W/AUTO DIFF WBC: CPT

## 2024-08-28 PROCEDURE — 81001 URINALYSIS AUTO W/SCOPE: CPT

## 2024-08-28 PROCEDURE — 36415 COLL VENOUS BLD VENIPUNCTURE: CPT

## 2024-08-28 PROCEDURE — 82077 ASSAY SPEC XCP UR&BREATH IA: CPT

## 2024-08-28 ASSESSMENT — PAIN - FUNCTIONAL ASSESSMENT
PAIN_FUNCTIONAL_ASSESSMENT: NONE - DENIES PAIN
PAIN_FUNCTIONAL_ASSESSMENT: NONE - DENIES PAIN

## 2024-08-28 NOTE — BSMART NOTE
Medicals emailed to MERON Logan, states pt will be TDO recommended.  Monalisa notified pt via phone re to TDO recommended.

## 2024-08-28 NOTE — FLOWSHEET NOTE
Registration states the patient did not write anything else but his name on the paper. When they asked he mumbled something they could not hear. When she asked him to repeat it he turned around and walked out the door. I called [facility] emergency number, dispatch states they had not had any calls for that patient.

## 2024-08-28 NOTE — ED NOTES
Dedicated constant care companion has arrival to sit with patient due to being a [facility] patient that [facility] plans to TDO him at this time.

## 2024-08-28 NOTE — BH NOTE
Monalisa with [facility] contacted to assess patient as he appears to lack capacity. She states that she will call back shortly to assess patient via Zoom.

## 2024-08-28 NOTE — ED NOTES
Patient belongings bagged and taken to 2S for washing.    Khaki coat  Green long sleeve shirt    Labeled with patient identifiers.

## 2024-08-28 NOTE — BSMART NOTE
Spoke with Kandis at [facility], Pt accepted to    [facility] [place]  Dr Daryl Muñiz is Accepting DR  Nurse to Nurse         Attending Nurse Brynn notified

## 2024-08-28 NOTE — BSMART NOTE
Spoke with Valeria from [facility], she will follow up with [facility] [employer] for transport and call back with more information

## 2024-08-28 NOTE — ED NOTES
Verbal report to Brynn THRASHER at this time. Patient remains in ER room 22, in green gown. Resting in stretcher, on IPAD with [facility]. No signs of distress noted.

## 2024-08-28 NOTE — ED PROVIDER NOTES
[facility]  EMERGENCY DEPARTMENT ENCOUNTER NOTE    Date: 8/28/2024  Patient Name: Doe Thomas    History of Presenting Illness     No chief complaint on file.      History obtained from: Patient    HPI: Doe Thomas, 34 y.o. male with past medical history as listed and reviewed below presents with an unclear chief complaint and incoherent trail of thought.  The patient walked to the ER initially and left and then came back.  The initial complaint was leg pain however he denies it.  While taking history, he does reports that he wants his name changed, but he is a spy for the government, makes intermittent inappropriate sexual remarks, cannot maintain a trail of thoughts and answer questions fully with intermittent tangential speech. He also reports that he came in to get the change of his clothes and underwear. He appears to have been taking care of by a caregiver which is likely the wife, but he reports that his wife has been incarcerated.  It was [place].  On review of systems, he denies specific symptoms including any headaches, chest pain, shortness breath, abdominal pain, nausea, vomiting, or weakness or numbness in the upper or lower extremities.  He does have a psychiatric history and has been admitted to our psychiatric facility in the past.  He is not aggressive, does not exhibit SI or HI, and is not responding to internal stimuli.      Medical History   I reviewed the medical, surgical, family, and social history, as well as allergies:    PCP: No primary care provider on file.    Past Medical History:  No past medical history on file.  Past Surgical History:  No past surgical history on file.  Current Outpatient Medications:  Current Outpatient Medications   Medication Instructions    risperiDONE (RISPERDAL) 1 mg, Oral, 2 TIMES DAILY      Family History:  No family history on file.  Social History:  Social History     Tobacco Use     and Reassessment for discussion and interpretations.    Records Reviewed: Nursing Notes and Old Medical Records  Social Determinants of health affecting management: None    ED Course and Reassessment     ED Course:     ED Course as of 08/29/24 2027   Wed Aug 28, 2024   0619 [facility] to assess patient. [SS]   0831 Spoke to [facility].  Patient will be under TDO.  One-to-one sitter is ordered. [AA]      ED Course User Index  [AA] Mi Webster MD  [SS] Quintin Greco MD       Reassessment:    Will need BH admit    Diagnosis     Clinical Impression:   1. Acute psychosis (HCC)        Final Disposition     Psychiatric Admission: ADMITTED TO PSYCHIATRIC FACILITY    After completion of ED workup and medical clearance, the patient was deemed a candidate for inpatient psychiatric treatment.    Procedures, Critical Care, & Clinical Tools   Performed by: Quintin Greco MD  Procedures     Not Applicable     Results, Consults, Medications     Consults:  None   Labs:  No results found for this or any previous visit (from the past 12 hour(s)).  Radiologic Studies:  No orders to display     Medications ordered:  Medications - No data to display    Documentation Comments   - I am the first and primary provider for this patient and am the primary provider of record.  - Initial assessment performed. The patients presenting problems have been discussed, and the staff are in agreement with the care plan formulated and outlined with them.  I have encouraged them to ask questions as they arise throughout their visit.  - Available medical records, nursing notes, old EKGs, and EMS run sheets (if patient was EMS transported) were reviewed    Please note that this dictation was completed with [employer], the computer voice recognition software.  Quite often unanticipated grammatical, syntax, homophones, and other interpretive errors are inadvertently transcribed by the computer software.  Please disregard these errors.  Please excuse any errors that have  escaped final proofreading.       Quintin Greco MD  08/29/24 2027
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
## 2024-08-28 NOTE — ED TRIAGE NOTES
Pt states he walked to the hospital. Pt wanting clothes underwear.  Pt with disconnected thought process. After attemptes to find out why he is here is repeat \" queen tuckerzabeth the second.\"  \" He needs his name changed\"  and \"[id number]\"